# Patient Record
Sex: MALE | Race: WHITE | NOT HISPANIC OR LATINO | URBAN - METROPOLITAN AREA
[De-identification: names, ages, dates, MRNs, and addresses within clinical notes are randomized per-mention and may not be internally consistent; named-entity substitution may affect disease eponyms.]

---

## 2017-01-17 ENCOUNTER — OFFICE VISIT (OUTPATIENT)
Dept: URGENT CARE | Facility: URGENT CARE | Age: 32
End: 2017-01-17
Payer: COMMERCIAL

## 2017-01-17 ENCOUNTER — TELEPHONE (OUTPATIENT)
Dept: NURSING | Facility: CLINIC | Age: 32
End: 2017-01-17

## 2017-01-17 VITALS
OXYGEN SATURATION: 99 % | HEART RATE: 72 BPM | DIASTOLIC BLOOD PRESSURE: 70 MMHG | WEIGHT: 150 LBS | SYSTOLIC BLOOD PRESSURE: 110 MMHG | TEMPERATURE: 97.4 F

## 2017-01-17 DIAGNOSIS — J06.9 UPPER RESPIRATORY TRACT INFECTION, UNSPECIFIED TYPE: Primary | ICD-10-CM

## 2017-01-17 PROCEDURE — 99203 OFFICE O/P NEW LOW 30 MIN: CPT | Performed by: INTERNAL MEDICINE

## 2017-01-17 NOTE — TELEPHONE ENCOUNTER
"Call Type: Triage Call    Presenting Problem: Onset 01/13/17 of fever, chills and \"Deep chest  cough.\"  Temp. 101.3/oral. Patient described a sudden onset of a  dry nonproductive cough, generalized bodyaches, fever.  Triage Note:  Guideline Title: Cough - Adult ; Flu-Like Symptoms  Recommended Disposition: Provide Home/Self Care  Original Inclination:  Override Disposition:  Intended Action:  Physician Contacted: No  Sudden onset of flu-like symptoms ?  YES  Severe breathing problems ? NO  Cough producing pink, frothy sputum ? NO  Breathing symptoms (post choking episode, shortness of breath, out of breath,  nasal flaring, change in skin color, anxiety) main problem ? NO  Signs of dehydration ? NO  Severe breathing problems ? NO  Breathing problems ? NO  Sudden change in mental status ? NO  Choking sensation, cannot swallow own saliva with associated drooling and soft  muffled voice ? NO  New onset of eye redness, irritation/foreign body sensation or gritty feeling with  watery or sticky mucus drainage ? NO  Temperature of 101.5 F (38.6 C) or greater that has not responded to 24 hours of  home care measures ? NO  New onset of stiff neck causing pain with forward head movement, severe  generalized headache, fever, or altered mental status ? NO  Any temperature elevation in an individual with a weakened immune system OR a  frail elderly person ? NO  Flu-like symptoms associated with a cough and breathing that is becoming more  difficult ? NO  Evaluated by provider AND symptoms worsening when following recommended treatment  plan for 48 hours ? NO  New OR worsening flu-like illness AND in high risk group for complications ? NO  In high risk group for complications of influenza AND has questions/concerns ? NO  Flu-like illness that has not improved after 7 days of home care ? NO  Gradual onset of upper respiratory illness signs and symptoms(If there is any  doubt that symptoms may be an upper respiratory illness, use " this guideline,  Flu-Like Symptoms ) ? NO  Being treated by a provider for a secondary infection AND no improvement in  symptoms, symptoms have worsened OR has new symptoms after following treatment  plan for the time specified by provider. ? NO  Severe headache not relieved with 8 hours of home care ? NO  New productive cough with thick colored sputum (other than clear or white sputum;  not postnasal drainage) ? NO  Pressure/pain above or below eyes, near ears over sinuses (may also be described  as fullness, worsens when bending forward, teeth or eye pain) ? NO  Physician Instructions:  Care Advice: Use a cool mist humidifier to moisten air.  Be sure to clean  according to 's instructions.  Aspirin should not be used in anyone, 18 years of age and under, for  temperature control, pain, or aches when flu is a possibility.  Consider use of a saline nasal spray per package directions to help relieve  nasal congestion.  Call provider immediately if develop a severe productive cough, fever over  101.5 F (38.6 C), and sharp pain when coughing.  Coughing up mucus or phlegm helps to get rid of an infection.  A productive  cough should not be stopped.  A cough medicine with guaifenesin  (Robitussin, Mucinex) can help loosen the mucus.  Cough medicine with  dextromethorphan (DM) should be avoided.  Drinking lots of fluids can help  loosen the mucus too, especially warm fluids.  See a provider immediately or go to the Emergency Department if having  chest pain with breathing, change in level of consciousness, new seizure,  vomiting and unable to keep fluids down for 8 hours or more, or has not  urinated for 8 or more hours.  To help prevent a widespread community outbreak of the flu, call the call  center, your clinic or provider's office to discuss any questions or  concerns before going in unless you have severe respiratory or any nervous  system symptoms.  Influenza - Expected Course: - Symptoms start to  improve in 3 to 7 days. -  Cough and feeling tired (malaise) may continue for several weeks. - Cough  and extreme fatigue lasting more than 3 weeks needs medical evaluation. -  Remain at home at least 24 hours after being free of fever 100F (37.8C)  without the use of fever reducing medication.  Rest as much as possible until symptoms improve.  Analgesic/Antipyretic Advice - NSAIDs: Consider aspirin, ibuprofen,  naproxen or ketoprofen for pain or fever as directed on label or by  pharmacist/provider. PRECAUTIONS: - You should not take this medicine for  more than 10 days unless recommended by your provider. EXCEPTIONS: - Should  not be used if taking blood thinners or have bleeding problems. - Do not  use if have history of sensitivity/allergy to any of these medications  or history of cardiovascular, ulcer, kidney, liver disease or diabetes  unless approved by provider. - Do not exceed recommended dose or frequency.  Influenza Respiratory Hygiene: - Cover the nose/mouth tightly with a tissue  when coughing or sneezing. - Use tissue one time and discard in the nearest  waste receptacle. - Wash hands with soap and water or alcohol-based hand  rub for at least 20 seconds after coming into contact with respiratory  secretions and contaminated objects/materials. - When a tissue is not  available, cough into the bend of the elbow. - Avoid touching your eyes,  nose or mouth. - When ill wear a disposable face mask when around others,  especially around those at high risk for flu-related complications, to help  decrease the likelihood of infecting them.  Analgesic/Antipyretic Advice - Acetaminophen:  Consider acetaminophen as  directed on label or by pharmacist/provider for pain or fever.  PRECAUTIONS:  - Use only if there is no history of liver disease,  alcoholism, or intake of three or more alcohol drinks per day.  - If  approved by provider when breastfeeding. - Do not exceed recommended dose  or frequency. Do not take  more than 3000 milligrams (mg) in 24 hours. Do  not take this medicine for more than 10 days unless recommended by your  provider. - To make sure you don't take too much, check other medicines you  take to see if they also contain acetaminophen.  Antiviral medication can make flu symptoms milder and shorten the time you  are ill. Flu  antiviral medications are recommended for individuals who  have a greater chance of  serious flu complications or who are very sick  with the flu. They may also be prescribed for individuals who are not a  high risk but who want to lessen flu symptoms and shorten  the time of the  illness. Antiviral medication works best if it is started within 48 hours  of  the first symptoms of flu.

## 2017-01-17 NOTE — NURSING NOTE
Chief Complaint   Patient presents with     Urgent Care     URI     since saturday. Congestion. Headache, sinus pain started yesterday. Clear mucous     Cough     Productive cough     Fever     101.5 yesterday       Initial /70 mmHg  Pulse 72  Temp(Src) 97.4  F (36.3  C) (Tympanic)  Wt 150 lb (68.04 kg)  SpO2 99% There is no height on file to calculate BMI.  BP completed using cuff size: YOLIE Frost

## 2017-01-17 NOTE — PROGRESS NOTES
[facility] Progress Note        Edward Rodríguez MD, MPH  01/17/2017        History:      Hugo Robles is a pleasant 31 year old year old male with a chief complaint of nasal congestion,non-productive cough since 3 days ago.   No fever or chills.   No dyspnea or chest pain.   No smoking history.   No headache or neck pain.  No GI or  symptoms.   No MSK symptoms.         Assessment and Plan:        URI:.  Discussed supportive care with the patient.  Advised to increase fluid intake and rest.  F/u w PCP in 4-5 days, earlier if symptoms worsen.                   Physical Exam:      /70 mmHg  Pulse 72  Temp(Src) 97.4  F (36.3  C) (Tympanic)  Wt 150 lb (68.04 kg)  SpO2 99%     Constitutional: Patient is in no distress The patient is pleasant and cooperative.   HEENT: Head:  Head is atraumatic, normocephalic.    Eyes: Pupils are equal, round and reactive to light and accomodation.  Sclera is non-icteric. No conjunctival injection, or exudate noted. Extraocular motion is intact. Visual acuity is intact bilaterally.  Ears:  External acoustic canals are patent and clear.  There is no erythema and bulging( exudate)  of the ( R/L ) tympanic membrane(s ).   Nose:   Nasal congestion w/o drainage or mucosal ulceration is noted.  Throat:  Oral mucosa is moist.  No oral lesions are noted.  No posterior pharyngeal hyperemia nor exudate noted.     Neck Supple.  There is no cervical lymphadenopathy.  No nuchal rigidity noted.  There is no meningismus.     Cardiovascular: Heart is regular to rate and rhythm.  No murmur is noted.     Lungs: Clear in the anterior and posterior pulmonary fields.   Abdomen: Soft and non-tender.    Back No flank tenderness is noted.   Extremeties No edema, no calf tenderness.   Neuro: No focal deficit.   Skin No petechiae or purpura is noted.  There is no rash.   Mood Normal              Data:      All new lab and imaging data was reviewed.   No results found for this or any  previous visit.

## 2017-03-28 ENCOUNTER — OFFICE VISIT - HEALTHEAST (OUTPATIENT)
Dept: CARDIOLOGY | Facility: CLINIC | Age: 32
End: 2017-03-28

## 2017-03-28 DIAGNOSIS — R55 PRE-SYNCOPE: ICD-10-CM

## 2017-03-28 DIAGNOSIS — R00.0 TACHYCARDIA: ICD-10-CM

## 2017-03-28 DIAGNOSIS — R00.2 PALPITATION: ICD-10-CM

## 2017-03-28 LAB
ATRIAL RATE - MUSE: 56 BPM
DIASTOLIC BLOOD PRESSURE - MUSE: NORMAL MMHG
INTERPRETATION ECG - MUSE: NORMAL
P AXIS - MUSE: -2 DEGREES
PR INTERVAL - MUSE: 186 MS
QRS DURATION - MUSE: 102 MS
QT - MUSE: 376 MS
QTC - MUSE: 362 MS
R AXIS - MUSE: 66 DEGREES
SYSTOLIC BLOOD PRESSURE - MUSE: NORMAL MMHG
T AXIS - MUSE: 53 DEGREES
VENTRICULAR RATE- MUSE: 56 BPM

## 2017-03-28 ASSESSMENT — MIFFLIN-ST. JEOR: SCORE: 1593.97

## 2017-04-07 ENCOUNTER — HOSPITAL ENCOUNTER (OUTPATIENT)
Dept: CARDIOLOGY | Facility: CLINIC | Age: 32
Discharge: HOME | End: 2017-04-07
Attending: INTERNAL MEDICINE

## 2017-04-07 DIAGNOSIS — R00.2 PALPITATION: ICD-10-CM

## 2017-04-07 DIAGNOSIS — R00.0 TACHYCARDIA: ICD-10-CM

## 2017-04-07 DIAGNOSIS — R55 PRE-SYNCOPE: ICD-10-CM

## 2017-04-07 LAB
AORTIC ROOT: 3.1 CM
BSA FOR ECHO PROCEDURE: 1.82 M2
CV BLOOD PRESSURE: NORMAL MMHG
CV ECHO HEIGHT: 68 IN
CV ECHO WEIGHT: 152 LBS
DOP CALC LVOT AREA: 3.14 CM2
DOP CALC LVOT DIAMETER: 2 CM
DOP CALC LVOT PEAK VEL: 80.8 CM/S
DOP CALC LVOT STROKE VOLUME: 44.9 CM3
DOP CALCLVOT PEAK VEL VTI: 14.3 CM
ECHO EJECTION FRACTION ESTIMATED: 60 %
EJECTION FRACTION: 57 % (ref 55–75)
FRACTIONAL SHORTENING: 31.1 % (ref 28–44)
INTERVENTRICULAR SEPTUM IN END DIASTOLE: 0.66 CM (ref 0.6–1)
IVS/PW RATIO: 0.9
LA AREA 1: 12.4 CM2
LA AREA 2: 14 CM2
LEFT ATRIUM LENGTH: 3.93 CM
LEFT ATRIUM SIZE: 3.5 CM
LEFT ATRIUM TO AORTIC ROOT RATIO: 1.13 NO UNITS
LEFT ATRIUM VOLUME INDEX: 20.6 ML/M2
LEFT ATRIUM VOLUME: 37.5 CM3
LEFT VENTRICLE CARDIAC INDEX: 1.6 L/MIN/M2
LEFT VENTRICLE CARDIAC OUTPUT: 2.9 L/MIN
LEFT VENTRICLE DIASTOLIC VOLUME INDEX: 50.5 CM3/M2 (ref 34–74)
LEFT VENTRICLE DIASTOLIC VOLUME: 92 CM3 (ref 62–150)
LEFT VENTRICLE HEART RATE: 64 BPM
LEFT VENTRICLE MASS INDEX: 77.6 G/M2
LEFT VENTRICLE SYSTOLIC VOLUME INDEX: 22 CM3/M2 (ref 11–31)
LEFT VENTRICLE SYSTOLIC VOLUME: 40 CM3 (ref 21–61)
LEFT VENTRICULAR INTERNAL DIMENSION IN DIASTOLE: 5.66 CM (ref 4.2–5.8)
LEFT VENTRICULAR INTERNAL DIMENSION IN SYSTOLE: 3.9 CM (ref 2.5–4)
LEFT VENTRICULAR MASS: 141.3 G
LEFT VENTRICULAR OUTFLOW TRACT MEAN GRADIENT: 2 MMHG
LEFT VENTRICULAR OUTFLOW TRACT MEAN VELOCITY: 57.1 CM/S
LEFT VENTRICULAR OUTFLOW TRACT PEAK GRADIENT: 3 MMHG
LEFT VENTRICULAR POSTERIOR WALL IN END DIASTOLE: 0.73 CM (ref 0.6–1)
LV STROKE VOLUME INDEX: 24.7 ML/M2
MITRAL VALVE E/A RATIO: 1.7
MV AVERAGE E/E' RATIO: 4.1 CM/S
MV DECELERATION TIME: 296 MS
MV E'TISSUE VEL-LAT: 16.3 CM/S
MV E'TISSUE VEL-MED: 12.4 CM/S
MV LATERAL E/E' RATIO: 3.6
MV MEDIAL E/E' RATIO: 4.8
MV PEAK A VELOCITY: 35.8 CM/S
MV PEAK E VELOCITY: 59.2 CM/S
NUC REST DIASTOLIC VOLUME INDEX: 2432 LBS
NUC REST SYSTOLIC VOLUME INDEX: 68 IN

## 2017-04-07 ASSESSMENT — MIFFLIN-ST. JEOR: SCORE: 1593.97

## 2017-05-22 ENCOUNTER — OFFICE VISIT - HEALTHEAST (OUTPATIENT)
Dept: INTERNAL MEDICINE | Facility: CLINIC | Age: 32
End: 2017-05-22

## 2017-05-22 DIAGNOSIS — Z00.00 PREVENTATIVE HEALTH CARE: ICD-10-CM

## 2017-05-22 ASSESSMENT — MIFFLIN-ST. JEOR: SCORE: 1608.89

## 2017-05-24 ENCOUNTER — COMMUNICATION - HEALTHEAST (OUTPATIENT)
Dept: INTERNAL MEDICINE | Facility: CLINIC | Age: 32
End: 2017-05-24

## 2017-05-24 ENCOUNTER — AMBULATORY - HEALTHEAST (OUTPATIENT)
Dept: LAB | Facility: CLINIC | Age: 32
End: 2017-05-24

## 2017-05-24 DIAGNOSIS — Z00.00 PREVENTATIVE HEALTH CARE: ICD-10-CM

## 2017-05-24 LAB
CHOLEST SERPL-MCNC: 177 MG/DL
FASTING STATUS PATIENT QL REPORTED: YES
HDLC SERPL-MCNC: 59 MG/DL
LDLC SERPL CALC-MCNC: 107 MG/DL
TRIGL SERPL-MCNC: 54 MG/DL

## 2017-05-25 ENCOUNTER — COMMUNICATION - HEALTHEAST (OUTPATIENT)
Dept: INTERNAL MEDICINE | Facility: CLINIC | Age: 32
End: 2017-05-25

## 2017-06-12 ENCOUNTER — COMMUNICATION - HEALTHEAST (OUTPATIENT)
Dept: INTERNAL MEDICINE | Facility: CLINIC | Age: 32
End: 2017-06-12

## 2017-06-20 ENCOUNTER — COMMUNICATION - HEALTHEAST (OUTPATIENT)
Dept: SCHEDULING | Facility: CLINIC | Age: 32
End: 2017-06-20

## 2017-06-23 ENCOUNTER — OFFICE VISIT - HEALTHEAST (OUTPATIENT)
Dept: INTERNAL MEDICINE | Facility: CLINIC | Age: 32
End: 2017-06-23

## 2017-06-23 DIAGNOSIS — F41.1 GAD (GENERALIZED ANXIETY DISORDER): ICD-10-CM

## 2017-06-23 DIAGNOSIS — Z11.1 TUBERCULOSIS SCREENING: ICD-10-CM

## 2017-06-23 ASSESSMENT — MIFFLIN-ST. JEOR: SCORE: 1576.96

## 2017-06-26 ENCOUNTER — AMBULATORY - HEALTHEAST (OUTPATIENT)
Dept: NURSING | Facility: CLINIC | Age: 32
End: 2017-06-26

## 2017-06-29 ENCOUNTER — COMMUNICATION - HEALTHEAST (OUTPATIENT)
Dept: INTERNAL MEDICINE | Facility: CLINIC | Age: 32
End: 2017-06-29

## 2017-06-29 DIAGNOSIS — J30.2 SEASONAL ALLERGIES: ICD-10-CM

## 2017-08-29 ENCOUNTER — OFFICE VISIT - HEALTHEAST (OUTPATIENT)
Dept: INTERNAL MEDICINE | Facility: CLINIC | Age: 32
End: 2017-08-29

## 2017-08-29 ENCOUNTER — COMMUNICATION - HEALTHEAST (OUTPATIENT)
Dept: INTERNAL MEDICINE | Facility: CLINIC | Age: 32
End: 2017-08-29

## 2017-08-29 DIAGNOSIS — S39.012A LUMBOSACRAL STRAIN, INITIAL ENCOUNTER: ICD-10-CM

## 2017-08-29 ASSESSMENT — MIFFLIN-ST. JEOR: SCORE: 1599.64

## 2017-09-24 ENCOUNTER — OFFICE VISIT - HEALTHEAST (OUTPATIENT)
Dept: FAMILY MEDICINE | Facility: CLINIC | Age: 32
End: 2017-09-24
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
## 2017-09-24 DIAGNOSIS — L03.113 CELLULITIS OF RIGHT UPPER ARM: ICD-10-CM

## 2017-10-19 ENCOUNTER — OFFICE VISIT - RIVER FALLS (OUTPATIENT)
Dept: FAMILY MEDICINE | Facility: CLINIC | Age: 32
End: 2017-10-19

## 2017-11-29 ENCOUNTER — OFFICE VISIT - RIVER FALLS (OUTPATIENT)
Dept: FAMILY MEDICINE | Facility: CLINIC | Age: 32
End: 2017-11-29

## 2018-05-18 ENCOUNTER — COMMUNICATION - HEALTHEAST (OUTPATIENT)
Dept: INTERNAL MEDICINE | Facility: CLINIC | Age: 33
End: 2018-05-18

## 2018-05-21 ENCOUNTER — OFFICE VISIT - HEALTHEAST (OUTPATIENT)
Dept: INTERNAL MEDICINE | Facility: CLINIC | Age: 33
End: 2018-05-21

## 2018-05-21 DIAGNOSIS — Z00.00 ROUTINE GENERAL MEDICAL EXAMINATION AT A HEALTH CARE FACILITY: ICD-10-CM

## 2018-05-21 DIAGNOSIS — R53.83 FATIGUE: ICD-10-CM

## 2018-05-21 LAB
ALBUMIN SERPL-MCNC: 4.1 G/DL (ref 3.5–5)
ALBUMIN UR-MCNC: NEGATIVE MG/DL
ALP SERPL-CCNC: 68 U/L (ref 45–120)
ALT SERPL W P-5'-P-CCNC: 40 U/L (ref 0–45)
ANION GAP SERPL CALCULATED.3IONS-SCNC: 10 MMOL/L (ref 5–18)
APPEARANCE UR: CLEAR
AST SERPL W P-5'-P-CCNC: 34 U/L (ref 0–40)
BASOPHILS # BLD AUTO: 0 THOU/UL (ref 0–0.2)
BASOPHILS NFR BLD AUTO: 1 % (ref 0–2)
BILIRUB SERPL-MCNC: 1 MG/DL (ref 0–1)
BILIRUB UR QL STRIP: NEGATIVE
BUN SERPL-MCNC: 24 MG/DL (ref 8–22)
CALCIUM SERPL-MCNC: 9.2 MG/DL (ref 8.5–10.5)
CHLORIDE BLD-SCNC: 105 MMOL/L (ref 98–107)
CHOLEST SERPL-MCNC: 202 MG/DL
CO2 SERPL-SCNC: 24 MMOL/L (ref 22–31)
COLOR UR AUTO: YELLOW
CREAT SERPL-MCNC: 1.06 MG/DL (ref 0.7–1.3)
EOSINOPHIL # BLD AUTO: 0.2 THOU/UL (ref 0–0.4)
EOSINOPHIL NFR BLD AUTO: 4 % (ref 0–6)
ERYTHROCYTE [DISTWIDTH] IN BLOOD BY AUTOMATED COUNT: 10.6 % (ref 11–14.5)
FASTING STATUS PATIENT QL REPORTED: YES
GFR SERPL CREATININE-BSD FRML MDRD: >60 ML/MIN/1.73M2
GLUCOSE BLD-MCNC: 87 MG/DL (ref 70–125)
GLUCOSE UR STRIP-MCNC: NEGATIVE MG/DL
HCT VFR BLD AUTO: 45.4 % (ref 40–54)
HDLC SERPL-MCNC: 60 MG/DL
HGB BLD-MCNC: 15.3 G/DL (ref 14–18)
HGB UR QL STRIP: NEGATIVE
KETONES UR STRIP-MCNC: NEGATIVE MG/DL
LDLC SERPL CALC-MCNC: 131 MG/DL
LEUKOCYTE ESTERASE UR QL STRIP: NEGATIVE
LYMPHOCYTES # BLD AUTO: 1.4 THOU/UL (ref 0.8–4.4)
LYMPHOCYTES NFR BLD AUTO: 34 % (ref 20–40)
MCH RBC QN AUTO: 31.5 PG (ref 27–34)
MCHC RBC AUTO-ENTMCNC: 33.7 G/DL (ref 32–36)
MCV RBC AUTO: 94 FL (ref 80–100)
MONOCYTES # BLD AUTO: 0.5 THOU/UL (ref 0–0.9)
MONOCYTES NFR BLD AUTO: 11 % (ref 2–10)
NEUTROPHILS # BLD AUTO: 2.1 THOU/UL (ref 2–7.7)
NEUTROPHILS NFR BLD AUTO: 51 % (ref 50–70)
NITRATE UR QL: NEGATIVE
PH UR STRIP: 6 [PH] (ref 5–8)
PLATELET # BLD AUTO: 211 THOU/UL (ref 140–440)
PMV BLD AUTO: 7.7 FL (ref 7–10)
POTASSIUM BLD-SCNC: 4.2 MMOL/L (ref 3.5–5)
PROT SERPL-MCNC: 7.5 G/DL (ref 6–8)
RBC # BLD AUTO: 4.85 MILL/UL (ref 4.4–6.2)
SODIUM SERPL-SCNC: 139 MMOL/L (ref 136–145)
SP GR UR STRIP: 1.01 (ref 1–1.03)
TRIGL SERPL-MCNC: 53 MG/DL
TSH SERPL DL<=0.005 MIU/L-ACNC: 1.33 UIU/ML (ref 0.3–5)
UROBILINOGEN UR STRIP-ACNC: NORMAL
WBC: 4 THOU/UL (ref 4–11)

## 2018-05-21 ASSESSMENT — MIFFLIN-ST. JEOR: SCORE: 1663.14

## 2018-05-23 ENCOUNTER — COMMUNICATION - HEALTHEAST (OUTPATIENT)
Dept: INTERNAL MEDICINE | Facility: CLINIC | Age: 33
End: 2018-05-23

## 2018-05-23 LAB — TESTOST SERPL-MCNC: 1097 NG/DL (ref 240–871)

## 2018-05-30 ENCOUNTER — COMMUNICATION - HEALTHEAST (OUTPATIENT)
Dept: INTERNAL MEDICINE | Facility: CLINIC | Age: 33
End: 2018-05-30

## 2018-06-19 ENCOUNTER — COMMUNICATION - HEALTHEAST (OUTPATIENT)
Dept: INTERNAL MEDICINE | Facility: CLINIC | Age: 33
End: 2018-06-19

## 2018-07-15 ENCOUNTER — OFFICE VISIT (OUTPATIENT)
Dept: URGENT CARE | Facility: URGENT CARE | Age: 33
End: 2018-07-15
Payer: COMMERCIAL

## 2018-07-15 VITALS
HEIGHT: 68 IN | TEMPERATURE: 98.3 F | SYSTOLIC BLOOD PRESSURE: 118 MMHG | DIASTOLIC BLOOD PRESSURE: 76 MMHG | HEART RATE: 80 BPM | BODY MASS INDEX: 24.25 KG/M2 | WEIGHT: 160 LBS

## 2018-07-15 DIAGNOSIS — R07.0 THROAT PAIN: Primary | ICD-10-CM

## 2018-07-15 LAB
DEPRECATED S PYO AG THROAT QL EIA: NORMAL
SPECIMEN SOURCE: NORMAL

## 2018-07-15 PROCEDURE — 87880 STREP A ASSAY W/OPTIC: CPT | Performed by: FAMILY MEDICINE

## 2018-07-15 PROCEDURE — 87081 CULTURE SCREEN ONLY: CPT | Performed by: FAMILY MEDICINE

## 2018-07-15 PROCEDURE — 99213 OFFICE O/P EST LOW 20 MIN: CPT | Performed by: FAMILY MEDICINE

## 2018-07-15 NOTE — PROGRESS NOTES
"SUBJECTIVE:   Hugo Robles is a 33 year old male presenting with a chief complaint of sore throat.  No fever, no congestion.  No close strep contact  Onset of symptoms was 3 day(s) ago.  Course of illness is worsening.    Severity moderate  Current and Associated symptoms: sore throat  Treatment measures tried include Tylenol/Ibuprofen, Fluids and Rest.  Predisposing factors include None.    No past medical history on file.  No current outpatient prescriptions on file.     Social History   Substance Use Topics     Smoking status: Never Smoker     Smokeless tobacco: Never Used     Alcohol use Not on file       ROS:  Review of systems negative except as stated above.    OBJECTIVE:  /76  Pulse 80  Temp 98.3  F (36.8  C) (Oral)  Ht 5' 8\" (1.727 m)  Wt 160 lb (72.6 kg)  BMI 24.33 kg/m2  GENERAL APPEARANCE: healthy, alert and no distress  EYES: EOMI,  PERRL, conjunctiva clear  HENT: ear canals and TM's normal.  Nose and mouth without ulcers, erythema or lesions.  Pharynx mildly redden, no exudates  NECK: supple, nontender, no lymphadenopathy  RESP: lungs clear to auscultation - no rales, rhonchi or wheezes  CV: regular rates and rhythm, normal S1 S2, no murmur noted  SKIN: no suspicious lesions or rashes  PSYCH: mentation appears normal and affect normal/bright    Results for orders placed or performed in visit on 07/15/18   Strep, Rapid Screen   Result Value Ref Range    Specimen Description Throat     Rapid Strep A Screen       NEGATIVE: No Group A streptococcal antigen detected by immunoassay, await culture report.       ASSESSMENT/PLAN:  (R07.0) Throat pain  (primary encounter diagnosis)  Comment: viral  Plan: Strep, Rapid Screen, Beta strep group A         culture, magic mouthwash suspension         (diphenhydrAMINE, lidocaine, aluminum-magnesium        & simethicone)            Reassurance given, reviewed symptomatic treatment, plenty of fluids and rest.  Will follow up on throat culture and treat if " positive for strep.  RX magic mouthwash given to help with symptoms.    Return to clinic if no resolution of symptoms    Liam Curiel MD

## 2018-07-15 NOTE — MR AVS SNAPSHOT
"              After Visit Summary   7/15/2018    Hugo Robles    MRN: [id number]           Patient Information     Date Of Birth          1985        Visit Information        Provider Department      7/15/2018 4:15 PM Liam Curiel MD [facility]        Today's Diagnoses     Throat pain    -  1       Follow-ups after your visit        Follow-up notes from your care team     Return if symptoms worsen or fail to improve.      Who to contact     If you have questions or need follow up information about today's clinic visit or your schedule please contact [facility] directly at [phone].  Normal or non-critical lab and imaging results will be communicated to you by [employer], letter or phone within 4 business days after the clinic has received the results. If you do not hear from us within 7 days, please contact the clinic through [employer] or phone. If you have a critical or abnormal lab result, we will notify you by phone as soon as possible.  Submit refill requests through Webcom or call your pharmacy and they will forward the refill request to us. Please allow 3 business days for your refill to be completed.          Additional Information About Your Visit        MyChart Information     Webcom lets you send messages to your doctor, view your test results, renew your prescriptions, schedule appointments and more. To sign up, go to [email or web address] . Click on \"Log in\" on the left side of the screen, which will take you to the Welcome page. Then click on \"Sign up Now\" on the right side of the page.     You will be asked to enter the access code listed below, as well as some personal information. Please follow the directions to create your username and password.     Your access code is: [email or web address]  Expires: 10/23/2018  3:00 PM     Your access code will  in 90 days. If you need help or a new code, please call your [place] clinic or [phone].      " "  Care EveryWhere ID     This is your Care EveryWhere ID. This could be used by other organizations to access your [facility] [place] medical records  [id number]        Your Vitals Were     Pulse Temperature Height BMI (Body Mass Index)          80 98.3  F (36.8  C) (Oral) 5' 8\" (1.727 m) 24.33 kg/m2         Blood Pressure from Last 3 Encounters:   07/15/18 118/76   01/17/17 110/70    Weight from Last 3 Encounters:   07/15/18 160 lb (72.6 kg)   01/17/17 150 lb (68 kg)              We Performed the Following     Beta strep group A culture     Strep, Rapid Screen          Today's Medication Changes          These changes are accurate as of 7/15/18 11:59 PM.  If you have any questions, ask your nurse or doctor.               Start taking these medicines.        Dose/Directions    lidocaine visc 2% 2.5mL/5mL & maalox/mylanta w/ simeth 2.5mL/5mL & diphenhydrAMINE 5mg/5mL Susp suspension   Commonly known as:  [facility]   Used for:  Throat pain   Started by:  Liam Curiel MD        Dose:  10 mL   Swish and swallow 10 mLs in mouth every 6 hours as needed for sore throat   Quantity:  120 mL   Refills:  0            Where to get your medicines      Some of these will need a paper prescription and others can be bought over the counter.  Ask your nurse if you have questions.     Bring a paper prescription for each of these medications     lidocaine visc 2% 2.5mL/5mL & maalox/mylanta w/ simeth 2.5mL/5mL & diphenhydrAMINE 5mg/5mL Susp suspension                Primary Care Provider Office Phone # Fax #    Kandice [facility] [phone] [address] [facility] [address]        Equal Access to Services     ADA SCHWARTZ AH: Guillermo Villarreal, suzan rooney, andrew jeong, walt mclean. [facility] [phone].    ATENCIÓN: Si habla español, tiene a easley disposición servicios gratuitos de asistencia lingüística. Llame al [phone].    We " comply with applicable federal civil rights laws and [place] laws. We do not discriminate on the basis of race, color, national origin, age, disability, sex, sexual orientation, or gender identity.            Thank you!     Thank you for choosing [facility]  for your care. Our goal is always to provide you with excellent care. Hearing back from our patients is one way we can continue to improve our services. Please take a few minutes to complete the written survey that you may receive in the mail after your visit with us. Thank you!             Your Updated Medication List - Protect others around you: Learn how to safely use, store and throw away your medicines at www.disposemymeds.org.          This list is accurate as of 7/15/18 11:59 PM.  Always use your most recent med list.                   Brand Name Dispense Instructions for use Diagnosis    lidocaine visc 2% 2.5mL/5mL & maalox/mylanta w/ simeth 2.5mL/5mL & diphenhydrAMINE 5mg/5mL Susp suspension    [facility]    120 mL    Swish and swallow 10 mLs in mouth every 6 hours as needed for sore throat    Throat pain

## 2018-07-16 LAB
BACTERIA SPEC CULT: NORMAL
SPECIMEN SOURCE: NORMAL

## 2018-07-18 ENCOUNTER — OFFICE VISIT - HEALTHEAST (OUTPATIENT)
Dept: INTERNAL MEDICINE | Facility: CLINIC | Age: 33
End: 2018-07-18

## 2018-07-18 DIAGNOSIS — J06.9 UPPER RESPIRATORY INFECTION: ICD-10-CM

## 2018-07-18 ASSESSMENT — MIFFLIN-ST. JEOR: SCORE: 1626.85

## 2018-08-20 ENCOUNTER — COMMUNICATION - HEALTHEAST (OUTPATIENT)
Dept: INTERNAL MEDICINE | Facility: CLINIC | Age: 33
End: 2018-08-20

## 2018-08-21 ENCOUNTER — AMBULATORY - HEALTHEAST (OUTPATIENT)
Dept: INTERNAL MEDICINE | Facility: CLINIC | Age: 33
End: 2018-08-21

## 2018-08-21 ENCOUNTER — AMBULATORY - HEALTHEAST (OUTPATIENT)
Dept: LAB | Facility: CLINIC | Age: 33
End: 2018-08-21

## 2018-08-21 ENCOUNTER — AMBULATORY - HEALTHEAST (OUTPATIENT)
Dept: NURSING | Facility: CLINIC | Age: 33
End: 2018-08-21

## 2018-08-21 DIAGNOSIS — Z11.1 SCREENING EXAMINATION FOR PULMONARY TUBERCULOSIS: ICD-10-CM

## 2018-08-21 DIAGNOSIS — Z11.1 PPD SCREENING TEST: ICD-10-CM

## 2018-08-25 LAB
GAMMA INTERFERON BACKGROUND BLD IA-ACNC: 0.06 IU/ML
M TB IFN-G BLD-IMP: NEGATIVE
MITOGEN IGNF BCKGRD COR BLD-ACNC: 0.02 IU/ML
MITOGEN IGNF BCKGRD COR BLD-ACNC: 0.06 IU/ML
QTF INTERPRETATION: NORMAL
QTF MITOGEN - NIL: 9.23 IU/ML

## 2018-10-07 ENCOUNTER — OFFICE VISIT (OUTPATIENT)
Dept: URGENT CARE | Facility: URGENT CARE | Age: 33
End: 2018-10-07
Payer: COMMERCIAL

## 2018-10-07 VITALS
BODY MASS INDEX: 23.85 KG/M2 | TEMPERATURE: 97.9 F | HEIGHT: 69 IN | HEART RATE: 59 BPM | WEIGHT: 161 LBS | OXYGEN SATURATION: 100 % | SYSTOLIC BLOOD PRESSURE: 113 MMHG | DIASTOLIC BLOOD PRESSURE: 72 MMHG

## 2018-10-07 DIAGNOSIS — J02.9 SORE THROAT: ICD-10-CM

## 2018-10-07 DIAGNOSIS — R05.9 COUGH: ICD-10-CM

## 2018-10-07 DIAGNOSIS — J20.8 ACUTE VIRAL BRONCHITIS: Primary | ICD-10-CM

## 2018-10-07 LAB
DEPRECATED S PYO AG THROAT QL EIA: NORMAL
SPECIMEN SOURCE: NORMAL

## 2018-10-07 PROCEDURE — 87801 DETECT AGNT MULT DNA AMPLI: CPT | Performed by: FAMILY MEDICINE

## 2018-10-07 PROCEDURE — 87880 STREP A ASSAY W/OPTIC: CPT | Performed by: FAMILY MEDICINE

## 2018-10-07 PROCEDURE — 87081 CULTURE SCREEN ONLY: CPT | Performed by: FAMILY MEDICINE

## 2018-10-07 PROCEDURE — 99213 OFFICE O/P EST LOW 20 MIN: CPT | Performed by: FAMILY MEDICINE

## 2018-10-07 RX ORDER — ALBUTEROL SULFATE 90 UG/1
2 AEROSOL, METERED RESPIRATORY (INHALATION) EVERY 6 HOURS PRN
Qty: 1 INHALER | Refills: 0 | Status: SHIPPED | OUTPATIENT
Start: 2018-10-07

## 2018-10-07 RX ORDER — CODEINE PHOSPHATE AND GUAIFENESIN 10; 100 MG/5ML; MG/5ML
2 SOLUTION ORAL EVERY 6 HOURS PRN
Qty: 120 ML | Refills: 0 | Status: SHIPPED | OUTPATIENT
Start: 2018-10-07

## 2018-10-07 RX ORDER — BENZONATATE 200 MG/1
200 CAPSULE ORAL 3 TIMES DAILY PRN
Qty: 21 CAPSULE | Refills: 0 | Status: SHIPPED | OUTPATIENT
Start: 2018-10-07

## 2018-10-07 NOTE — MR AVS SNAPSHOT
After Visit Summary   10/7/2018    Hugo Robles    MRN: [id number]           Patient Information     Date Of Birth          1985        Visit Information        Provider Department      10/7/2018 10:10 AM Liam Curiel MD [facility]        Today's Diagnoses     Cough    -  1    Sore throat          Care Instructions    Okay to take ibuprofen 200 mg - 4 tablets (800 mg) every 8 hours as needed.  Okay to take tylenol 500 mg - 2 tablets (1000 mg) every 6-8 hours as needed, do not exceed 3000 mg in 24 hours.  Okay for tessalon perles three times a day for cough.  Okay to use albuterol inhaler every 4-6 hours to help with cough.  Use robitussin with codeine at bedtime to help with cough.    We will contact you if pending results are positive.      Viral Upper Respiratory Illness (Adult)  You have a viral upper respiratory illness (URI), which is another term for the common cold. This illness is contagious during the first few days. It is spread through the air by coughing and sneezing. It may also be spread by direct contact (touching the sick person and then touching your own eyes, nose, or mouth). Frequent handwashing will decrease risk of spread. Most viral illnesses go away within 7 to 10 days with rest and simple home remedies. Sometimes the illness may last for several weeks. Antibiotics will not kill a virus, and they are generally not prescribed for this condition.    Home care    If symptoms are severe, rest at home for the first 2 to 3 days. When you resume activity, don't let yourself get too tired.    Avoid being exposed to cigarette smoke (yours or others ).    You may use acetaminophen or ibuprofen to control pain and fever, unless another medicine was prescribed. If you have chronic liver or kidney disease, have ever had a stomach ulcer or gastrointestinal bleeding, or are taking blood-thinning medicines, talk with your healthcare provider before using these  medicines. Aspirin should never be given to anyone under 18 years of age who is ill with a viral infection or fever. It may cause severe liver or brain damage.    Your appetite may be poor, so a light diet is fine. Avoid dehydration by drinking 6 to 8 glasses of fluids per day (water, soft drinks, juices, tea, or soup). Extra fluids will help loosen secretions in the nose and lungs.    Over-the-counter cold medicines will not shorten the length of time you re sick, but they may be helpful for the following symptoms: cough, sore throat, and nasal and sinus congestion. (Note: Do not use decongestants if you have high blood pressure.)  Follow-up care  Follow up with your healthcare provider, or as advised.  When to seek medical advice  Call your healthcare provider right away if any of these occur:    Cough with lots of colored sputum (mucus)    Severe headache; face, neck, or ear pain    Difficulty swallowing due to throat pain    Fever of 100.4 F (38 C) or higher, or as directed by your healthcare provider  Call 911  Call 911 if any of these occur:    Chest pain, shortness of breath, wheezing, or difficulty breathing    Coughing up blood    Inability to swallow due to throat pain  Date Last Reviewed: 9/13/2015 2000-2017 [employer]. [address] [place] [address], [place] [address]. All rights reserved. This information is not intended as a substitute for professional medical care. Always follow your healthcare professional's instructions.        Whooping Cough (Pertussis) (Adult)  Whooping cough (pertussis) is a bacterial infection in the respiratory tract. It can be a very serious infection in infants and older adults. In healthy older children and adults, it is generally mild.  Pertussis is highly contagious. The infection is spread through the air by coughing or sneezing. The illness starts like an ordinary cold with mild cough, congestion, and low fever. Symptoms then develop that may  "include:    Coughing spells that cause a \"whooping\" sound when breathing in    Gagging or vomiting after coughing    Poor appetite    Feeling very tired    Thick mucus in the nose and throat  Antibiotics are used to treat this illness. Even with treatment, it may take up to 3 months for the cough to go away completely.  Vaccination prevents pertussis in children. The vaccine effect lessens after 5 to 10 years. So a booster vaccine is often needed. Teens and adults who were vaccinated as children and have not had a booster can be infected and may spread the infection to unvaccinated infants and children. Be sure to ask your healthcare provider whether anyone in your household needs a booster vaccination.  Home Care    Take all medicine as prescribed by the healthcare provider. Be sure to take antibiotics as directed until they are gone, even if you feel better. If you do not finish the antibiotics, the infection may come back and be harder to treat.    Rest and get plenty of sleep.    Stay home from work or school until you have completed at least 5 days of antibiotic treatment. If antibiotics are not used, stay home until 21 days after you first had symptoms of a cough. When resuming activity, go back to your normal routine gradually.    Avoid exposure to cigarette smoke.    Ask your healthcare provider before taking over-the-counter medicine for fever, pain, and coughing.    To avoid loss of fluids (dehydration), try drinking 6-8 glasses of fluids (water, juice, tea, soup) a day. Fluids will help loosen secretions in the nose and lungs.    Cover your mouth and nose when you sneeze or cough. Dispose of any tissues properly.    Wash your hands well with soap and water after you cough or sneeze, and frequently throughout the day.  Follow-up care  Follow up with your healthcare provider as advised.  When to seek medical advice  Call your healthcare provider right away for any of the following:    Trouble breathing or " "painful breathing    Cough with repeated gagging or vomiting    Coughing up colored or bloody mucus    Severe headache or face, neck, or ear pain    Fever over 100.4 F (38.0 C) for more than 3 days    You don't start improving within 1 week  Date Last Reviewed: 9/25/2015 2000-2017 [employer]. [address] [place] [address]. All rights reserved. This information is not intended as a substitute for professional medical care. Always follow your healthcare professional's instructions.                Follow-ups after your visit        Who to contact     If you have questions or need follow up information about today's clinic visit or your schedule please contact [facility] directly at [phone].  Normal or non-critical lab and imaging results will be communicated to you by Firebasehart, letter or phone within 4 business days after the clinic has received the results. If you do not hear from us within 7 days, please contact the clinic through Firebasehart or phone. If you have a critical or abnormal lab result, we will notify you by phone as soon as possible.  Submit refill requests through Penana or call your pharmacy and they will forward the refill request to us. Please allow 3 business days for your refill to be completed.          Additional Information About Your Visit        Penana Information     Penana lets you send messages to your doctor, view your test results, renew your prescriptions, schedule appointments and more. To sign up, go to [email or web address] . Click on \"Log in\" on the left side of the screen, which will take you to the Welcome page. Then click on \"Sign up Now\" on the right side of the page.     You will be asked to enter the access code listed below, as well as some personal information. Please follow the directions to create your username and password.     Your access code is: [email or web address]  Expires: 10/23/2018  3:00 PM     Your access code " "will  in 90 days. If you need help or a new code, please call your [facility] clinic or [phone].        Care EveryWhere ID     This is your Care EveryWhere ID. This could be used by other organizations to access your [facility] medical records  [id number]        Your Vitals Were     Pulse Temperature Height Pulse Oximetry BMI (Body Mass Index)       59 97.9  F (36.6  C) (Oral) 5' 8.5\" (1.74 m) 100% 24.12 kg/m2        Blood Pressure from Last 3 Encounters:   10/07/18 113/72   07/15/18 118/76   17 110/70    Weight from Last 3 Encounters:   10/07/18 161 lb (73 kg)   07/15/18 160 lb (72.6 kg)   17 150 lb (68 kg)              We Performed the Following     Beta strep group A culture     Bordetella per/paraper PCR     Rapid strep screen          Today's Medication Changes          These changes are accurate as of 10/7/18 11:14 AM.  If you have any questions, ask your nurse or doctor.               Start taking these medicines.        Dose/Directions    albuterol 108 (90 Base) MCG/ACT inhaler   Commonly known as:  PROAIR HFA/PROVENTIL HFA/VENTOLIN HFA   Used for:  Cough   Started by:  Liam Curiel MD        Dose:  2 puff   Inhale 2 puffs into the lungs every 6 hours as needed for shortness of breath / dyspnea or wheezing   Quantity:  1 Inhaler   Refills:  0       benzonatate 200 MG capsule   Commonly known as:  TESSALON   Used for:  Cough   Started by:  Liam Curiel MD        Dose:  200 mg   Take 1 capsule (200 mg) by mouth 3 times daily as needed for cough   Quantity:  21 capsule   Refills:  0       guaiFENesin-codeine 100-10 MG/5ML Soln solution   Commonly known as:  ROBITUSSIN AC   Used for:  Cough   Started by:  Liam Curiel MD        Dose:  2 tsp.   Take 10 mLs by mouth every 6 hours as needed for cough   Quantity:  120 mL   Refills:  0            Where to get your medicines      These medications were sent to [id number] - [place], [place] - [address]  [address], [place] [address]     Phone:  [phone]     albuterol 108 (90 Base) MCG/ACT inhaler    benzonatate 200 MG capsule         Some of these will need a paper prescription and others can be bought over the counter.  Ask your nurse if you have questions.     Bring a paper prescription for each of these medications     guaiFENesin-codeine 100-10 MG/5ML Soln solution                Primary Care Provider Office Phone # Fax #    [facility] [phone] [address] [facility] [address]        Equal Access to Services     [facility] : Hadii aad ku hadasho Soomaali, waaxda luqadaha, qaybta kaalmada aderakanyada, walt liz . [facility] [phone].    ATENCIÓN: Si habla español, tiene a easley disposición servicios gratuitos de asistencia lingüística. [facility] [phone].    We comply with applicable federal civil rights laws and [place] laws. We do not discriminate on the basis of race, color, national origin, age, disability, sex, sexual orientation, or gender identity.            Thank you!     Thank you for choosing [facility]  for your care. Our goal is always to provide you with excellent care. Hearing back from our patients is one way we can continue to improve our services. Please take a few minutes to complete the written survey that you may receive in the mail after your visit with us. Thank you!             Your Updated Medication List - Protect others around you: Learn how to safely use, store and throw away your medicines at www.disposemymeds.org.          This list is accurate as of 10/7/18 11:14 AM.  Always use your most recent med list.                   Brand Name Dispense Instructions for use Diagnosis    albuterol 108 (90 Base) MCG/ACT inhaler    PROAIR HFA/PROVENTIL HFA/VENTOLIN HFA    1 Inhaler    Inhale 2 puffs into the lungs every 6 hours as needed for shortness of breath /  dyspnea or wheezing    Cough       benzonatate 200 MG capsule    TESSALON    21 capsule    Take 1 capsule (200 mg) by mouth 3 times daily as needed for cough    Cough       guaiFENesin-codeine 100-10 MG/5ML Soln solution    ROBITUSSIN AC    120 mL    Take 10 mLs by mouth every 6 hours as needed for cough    Cough       lidocaine visc 2% 2.5mL/5mL & maalox/mylanta w/ simeth 2.5mL/5mL & diphenhydrAMINE 5mg/5mL Susp suspension    [facility]    120 mL    Swish and swallow 10 mLs in mouth every 6 hours as needed for sore throat    Throat pain

## 2018-10-07 NOTE — PATIENT INSTRUCTIONS
Okay to take ibuprofen 200 mg - 4 tablets (800 mg) every 8 hours as needed.  Okay to take tylenol 500 mg - 2 tablets (1000 mg) every 6-8 hours as needed, do not exceed 3000 mg in 24 hours.  Okay for tessalon perles three times a day for cough.  Okay to use albuterol inhaler every 4-6 hours to help with cough.  Use robitussin with codeine at bedtime to help with cough.    We will contact you if pending results are positive.      Viral Upper Respiratory Illness (Adult)  You have a viral upper respiratory illness (URI), which is another term for the common cold. This illness is contagious during the first few days. It is spread through the air by coughing and sneezing. It may also be spread by direct contact (touching the sick person and then touching your own eyes, nose, or mouth). Frequent handwashing will decrease risk of spread. Most viral illnesses go away within 7 to 10 days with rest and simple home remedies. Sometimes the illness may last for several weeks. Antibiotics will not kill a virus, and they are generally not prescribed for this condition.    Home care    If symptoms are severe, rest at home for the first 2 to 3 days. When you resume activity, don't let yourself get too tired.    Avoid being exposed to cigarette smoke (yours or others ).    You may use acetaminophen or ibuprofen to control pain and fever, unless another medicine was prescribed. If you have chronic liver or kidney disease, have ever had a stomach ulcer or gastrointestinal bleeding, or are taking blood-thinning medicines, talk with your healthcare provider before using these medicines. Aspirin should never be given to anyone under 18 years of age who is ill with a viral infection or fever. It may cause severe liver or brain damage.    Your appetite may be poor, so a light diet is fine. Avoid dehydration by drinking 6 to 8 glasses of fluids per day (water, soft drinks, juices, tea, or soup). Extra fluids will help loosen secretions in the  "nose and lungs.    Over-the-counter cold medicines will not shorten the length of time you re sick, but they may be helpful for the following symptoms: cough, sore throat, and nasal and sinus congestion. (Note: Do not use decongestants if you have high blood pressure.)  Follow-up care  Follow up with your healthcare provider, or as advised.  When to seek medical advice  Call your healthcare provider right away if any of these occur:    Cough with lots of colored sputum (mucus)    Severe headache; face, neck, or ear pain    Difficulty swallowing due to throat pain    Fever of 100.4 F (38 C) or higher, or as directed by your healthcare provider  Call 911  Call 911 if any of these occur:    Chest pain, shortness of breath, wheezing, or difficulty breathing    Coughing up blood    Inability to swallow due to throat pain  Date Last Reviewed: 9/13/2015 2000-2017 [employer]. [address] [place] [address]. All rights reserved. This information is not intended as a substitute for professional medical care. Always follow your healthcare professional's instructions.        Whooping Cough (Pertussis) (Adult)  Whooping cough (pertussis) is a bacterial infection in the respiratory tract. It can be a very serious infection in infants and older adults. In healthy older children and adults, it is generally mild.  Pertussis is highly contagious. The infection is spread through the air by coughing or sneezing. The illness starts like an ordinary cold with mild cough, congestion, and low fever. Symptoms then develop that may include:    Coughing spells that cause a \"whooping\" sound when breathing in    Gagging or vomiting after coughing    Poor appetite    Feeling very tired    Thick mucus in the nose and throat  Antibiotics are used to treat this illness. Even with treatment, it may take up to 3 months for the cough to go away completely.  Vaccination prevents pertussis in children. The vaccine effect " lessens after 5 to 10 years. So a booster vaccine is often needed. Teens and adults who were vaccinated as children and have not had a booster can be infected and may spread the infection to unvaccinated infants and children. Be sure to ask your healthcare provider whether anyone in your household needs a booster vaccination.  Home Care    Take all medicine as prescribed by the healthcare provider. Be sure to take antibiotics as directed until they are gone, even if you feel better. If you do not finish the antibiotics, the infection may come back and be harder to treat.    Rest and get plenty of sleep.    Stay home from work or school until you have completed at least 5 days of antibiotic treatment. If antibiotics are not used, stay home until 21 days after you first had symptoms of a cough. When resuming activity, go back to your normal routine gradually.    Avoid exposure to cigarette smoke.    Ask your healthcare provider before taking over-the-counter medicine for fever, pain, and coughing.    To avoid loss of fluids (dehydration), try drinking 6-8 glasses of fluids (water, juice, tea, soup) a day. Fluids will help loosen secretions in the nose and lungs.    Cover your mouth and nose when you sneeze or cough. Dispose of any tissues properly.    Wash your hands well with soap and water after you cough or sneeze, and frequently throughout the day.  Follow-up care  Follow up with your healthcare provider as advised.  When to seek medical advice  Call your healthcare provider right away for any of the following:    Trouble breathing or painful breathing    Cough with repeated gagging or vomiting    Coughing up colored or bloody mucus    Severe headache or face, neck, or ear pain    Fever over 100.4 F (38.0 C) for more than 3 days    You don't start improving within 1 week  Date Last Reviewed: 9/25/2015 2000-2017 [employer]. [address] [place] [address], [place] [address]. All rights reserved. This  information is not intended as a substitute for professional medical care. Always follow your healthcare professional's instructions.

## 2018-10-07 NOTE — PROGRESS NOTES
"SUBJECTIVE:   Hugo Robles is a 33 year old male presenting with a chief complaint of sore throat, cough and congestion, fatigue.  Denies fever but has felt hot.  Denies any rash.  Felt nauseated this morning.  Coughing has gotten worse, will get into coughing fits.  Phlegm in the morning but otherwise dry cough during the day.  Poor sleep and unable to rest.  Onset of symptoms was 5 day(s) ago.  Course of illness is worsening.    Severity moderate  Current and Associated symptoms: cough, fatigue, sore throat, chest painful  Treatment measures tried include Tylenol/Ibuprofen, Fluids and Rest.  Predisposing factors include ill contact: works as teacher at school.    Had tonsillectomy as child.  Denies history of asthma.  No TOB use.  Unsure of Tdap but thinks it is within 10 years.    No past medical history on file.  Current Outpatient Prescriptions   Medication Sig Dispense Refill     magic mouthwash suspension (diphenhydrAMINE, lidocaine, aluminum-magnesium & simethicone) Swish and swallow 10 mLs in mouth every 6 hours as needed for sore throat (Patient not taking: Reported on 10/7/2018) 120 mL 0     Social History   Substance Use Topics     Smoking status: Never Smoker     Smokeless tobacco: Never Used     Alcohol use Not on file       ROS:  Review of systems negative except as stated above.    OBJECTIVE:  /72  Pulse 59  Temp 97.9  F (36.6  C) (Oral)  Ht 5' 8.5\" (1.74 m)  Wt 161 lb (73 kg)  SpO2 100%  BMI 24.12 kg/m2  GENERAL APPEARANCE: healthy, alert and no distress  EYES: EOMI,  PERRL, conjunctiva clear  HENT: ear canals and TM's normal.  Nose and mouth without ulcers, erythema or lesions  NECK: supple, nontender, no lymphadenopathy  RESP: lungs clear to auscultation - no rales, rhonchi or wheezes  CV: regular rates and rhythm, normal S1 S2, no murmur noted  Extremities: no peripheral edema or tenderness, peripheral pulses normal  SKIN: no suspicious lesions or rashes  PSYCH: mentation appears " normal and affect normal/bright    Results for orders placed or performed in visit on 10/07/18   Rapid strep screen   Result Value Ref Range    Specimen Description Throat     Rapid Strep A Screen       NEGATIVE: No Group A streptococcal antigen detected by immunoassay, await culture report.       ASSESSMENT/PLAN:  (J20.8) Acute viral bronchitis  (primary encounter diagnosis)  Plan: benzonatate (TESSALON) 200 MG capsule,         guaiFENesin-codeine (ROBITUSSIN AC) 100-10         MG/5ML SOLN solution, albuterol (PROAIR         HFA/PROVENTIL HFA/VENTOLIN HFA) 108 (90 Base)         MCG/ACT inhaler            (R05) Cough  Comment: viral  Plan: Bordetella per/paraper PCR, benzonatate         (TESSALON) 200 MG capsule, guaiFENesin-codeine         (ROBITUSSIN AC) 100-10 MG/5ML SOLN solution,         albuterol (PROAIR HFA/PROVENTIL HFA/VENTOLIN         HFA) 108 (90 Base) MCG/ACT inhaler, Beta strep         group A culture            (J02.9) Sore throat  Comment: viral  Plan: Rapid strep screen            Reassurance given, reviewed symptomatic treatment, plenty of fluids and rest.  Will screen for pertussis as patient reports coughing fits/spasm.  Discussed that if positive that will need antibiotic treatment.  RX tessalon perles, albuterol inhaler and janiya AC given to help with cough.  Will follow up on throat culture and treat if positive for strep.    Follow up with primary provider if no resolution of symptoms.    Liam Curiel MD  October 7, 2018 11:34 AM

## 2018-10-08 ENCOUNTER — OFFICE VISIT - HEALTHEAST (OUTPATIENT)
Dept: INTERNAL MEDICINE | Facility: CLINIC | Age: 33
End: 2018-10-08

## 2018-10-08 DIAGNOSIS — J06.9 VIRAL URI WITH COUGH: ICD-10-CM

## 2018-10-08 LAB
B PERT+PARAPERT DNA PNL SPEC NAA+PROBE: NEGATIVE
BACTERIA SPEC CULT: NORMAL
SPECIMEN SOURCE: NORMAL
SPECIMEN SOURCE: NORMAL

## 2018-10-08 ASSESSMENT — MIFFLIN-ST. JEOR: SCORE: 1631.39

## 2018-10-09 ENCOUNTER — OFFICE VISIT - HEALTHEAST (OUTPATIENT)
Dept: INTERNAL MEDICINE | Facility: CLINIC | Age: 33
End: 2018-10-09

## 2018-10-09 DIAGNOSIS — J20.9 ACUTE BRONCHITIS: ICD-10-CM

## 2018-10-09 DIAGNOSIS — H10.33 ACUTE CONJUNCTIVITIS OF BOTH EYES: ICD-10-CM

## 2018-10-09 ASSESSMENT — MIFFLIN-ST. JEOR: SCORE: 1635.92

## 2019-01-18 ENCOUNTER — OFFICE VISIT - HEALTHEAST (OUTPATIENT)
Dept: INTERNAL MEDICINE | Facility: CLINIC | Age: 34
End: 2019-01-18

## 2019-01-18 DIAGNOSIS — S16.1XXA STRAIN OF NECK MUSCLE, INITIAL ENCOUNTER: ICD-10-CM

## 2019-01-18 ASSESSMENT — MIFFLIN-ST. JEOR: SCORE: 1654.07

## 2019-01-25 ENCOUNTER — HOSPITAL ENCOUNTER (OUTPATIENT)
Dept: RADIOLOGY | Facility: HOSPITAL | Age: 34
Discharge: HOME | End: 2019-01-25

## 2019-01-25 ENCOUNTER — HOSPITAL ENCOUNTER (OUTPATIENT)
Dept: PHYSICAL MEDICINE AND REHAB | Facility: CLINIC | Age: 34
Discharge: HOME | End: 2019-01-25
Attending: INTERNAL MEDICINE

## 2019-01-25 DIAGNOSIS — M54.2 NECK PAIN ON LEFT SIDE: ICD-10-CM

## 2019-01-25 DIAGNOSIS — M79.18 MYOFASCIAL PAIN: ICD-10-CM

## 2019-01-28 ENCOUNTER — OFFICE VISIT - HEALTHEAST (OUTPATIENT)
Dept: PHYSICAL THERAPY | Facility: REHABILITATION | Age: 34
End: 2019-01-28

## 2019-01-28 DIAGNOSIS — M54.2 NECK PAIN ON LEFT SIDE: ICD-10-CM

## 2019-01-28 DIAGNOSIS — M62.9 MUSCULOSKELETAL DISORDER INVOLVING UPPER TRAPEZIUS MUSCLE: ICD-10-CM

## 2019-01-29 ENCOUNTER — COMMUNICATION - HEALTHEAST (OUTPATIENT)
Dept: PHYSICAL MEDICINE AND REHAB | Facility: CLINIC | Age: 34
End: 2019-01-29

## 2019-02-15 ENCOUNTER — OFFICE VISIT - HEALTHEAST (OUTPATIENT)
Dept: PHYSICAL THERAPY | Facility: REHABILITATION | Age: 34
End: 2019-02-15

## 2019-02-15 DIAGNOSIS — M62.9 MUSCULOSKELETAL DISORDER INVOLVING UPPER TRAPEZIUS MUSCLE: ICD-10-CM

## 2019-02-15 DIAGNOSIS — M54.2 NECK PAIN ON LEFT SIDE: ICD-10-CM

## 2019-02-22 ENCOUNTER — OFFICE VISIT - HEALTHEAST (OUTPATIENT)
Dept: PHYSICAL THERAPY | Facility: REHABILITATION | Age: 34
End: 2019-02-22

## 2019-02-22 DIAGNOSIS — M62.9 MUSCULOSKELETAL DISORDER INVOLVING UPPER TRAPEZIUS MUSCLE: ICD-10-CM

## 2019-02-22 DIAGNOSIS — M54.2 NECK PAIN ON LEFT SIDE: ICD-10-CM

## 2019-03-01 ENCOUNTER — OFFICE VISIT - HEALTHEAST (OUTPATIENT)
Dept: PHYSICAL THERAPY | Facility: REHABILITATION | Age: 34
End: 2019-03-01

## 2019-03-01 DIAGNOSIS — M62.9 MUSCULOSKELETAL DISORDER INVOLVING UPPER TRAPEZIUS MUSCLE: ICD-10-CM

## 2019-03-01 DIAGNOSIS — M54.2 NECK PAIN ON LEFT SIDE: ICD-10-CM

## 2019-03-08 ENCOUNTER — OFFICE VISIT - HEALTHEAST (OUTPATIENT)
Dept: PHYSICAL THERAPY | Facility: REHABILITATION | Age: 34
End: 2019-03-08

## 2019-03-08 DIAGNOSIS — M54.2 NECK PAIN ON LEFT SIDE: ICD-10-CM

## 2019-03-08 DIAGNOSIS — M62.9 MUSCULOSKELETAL DISORDER INVOLVING UPPER TRAPEZIUS MUSCLE: ICD-10-CM

## 2019-03-15 ENCOUNTER — OFFICE VISIT - HEALTHEAST (OUTPATIENT)
Dept: PHYSICAL THERAPY | Facility: REHABILITATION | Age: 34
End: 2019-03-15

## 2019-03-15 DIAGNOSIS — M54.2 NECK PAIN ON LEFT SIDE: ICD-10-CM

## 2019-03-15 DIAGNOSIS — M62.9 MUSCULOSKELETAL DISORDER INVOLVING UPPER TRAPEZIUS MUSCLE: ICD-10-CM

## 2019-03-22 ENCOUNTER — OFFICE VISIT - HEALTHEAST (OUTPATIENT)
Dept: PHYSICAL THERAPY | Facility: REHABILITATION | Age: 34
End: 2019-03-22

## 2019-03-22 DIAGNOSIS — M62.9 MUSCULOSKELETAL DISORDER INVOLVING UPPER TRAPEZIUS MUSCLE: ICD-10-CM

## 2019-03-22 DIAGNOSIS — M54.2 NECK PAIN ON LEFT SIDE: ICD-10-CM

## 2019-03-29 ENCOUNTER — OFFICE VISIT - HEALTHEAST (OUTPATIENT)
Dept: PHYSICAL THERAPY | Facility: REHABILITATION | Age: 34
End: 2019-03-29

## 2019-03-29 DIAGNOSIS — M62.9 MUSCULOSKELETAL DISORDER INVOLVING UPPER TRAPEZIUS MUSCLE: ICD-10-CM

## 2019-03-29 DIAGNOSIS — M54.2 NECK PAIN ON LEFT SIDE: ICD-10-CM

## 2019-04-12 ENCOUNTER — OFFICE VISIT - HEALTHEAST (OUTPATIENT)
Dept: PHYSICAL THERAPY | Facility: REHABILITATION | Age: 34
End: 2019-04-12

## 2019-04-12 DIAGNOSIS — M54.2 NECK PAIN ON LEFT SIDE: ICD-10-CM

## 2019-04-12 DIAGNOSIS — M62.9 MUSCULOSKELETAL DISORDER INVOLVING UPPER TRAPEZIUS MUSCLE: ICD-10-CM

## 2019-04-19 ENCOUNTER — OFFICE VISIT - HEALTHEAST (OUTPATIENT)
Dept: PHYSICAL THERAPY | Facility: REHABILITATION | Age: 34
End: 2019-04-19

## 2019-04-19 DIAGNOSIS — M54.2 NECK PAIN ON LEFT SIDE: ICD-10-CM

## 2019-04-19 DIAGNOSIS — M62.9 MUSCULOSKELETAL DISORDER INVOLVING UPPER TRAPEZIUS MUSCLE: ICD-10-CM

## 2019-04-26 ENCOUNTER — OFFICE VISIT - HEALTHEAST (OUTPATIENT)
Dept: PHYSICAL THERAPY | Facility: REHABILITATION | Age: 34
End: 2019-04-26

## 2019-04-26 DIAGNOSIS — M54.2 NECK PAIN ON LEFT SIDE: ICD-10-CM

## 2019-04-26 DIAGNOSIS — M62.9 MUSCULOSKELETAL DISORDER INVOLVING UPPER TRAPEZIUS MUSCLE: ICD-10-CM

## 2019-05-03 ENCOUNTER — OFFICE VISIT - HEALTHEAST (OUTPATIENT)
Dept: PHYSICAL THERAPY | Facility: REHABILITATION | Age: 34
End: 2019-05-03

## 2019-05-03 DIAGNOSIS — M54.2 NECK PAIN ON LEFT SIDE: ICD-10-CM

## 2019-05-03 DIAGNOSIS — M62.9 MUSCULOSKELETAL DISORDER INVOLVING UPPER TRAPEZIUS MUSCLE: ICD-10-CM

## 2019-05-10 ENCOUNTER — OFFICE VISIT - HEALTHEAST (OUTPATIENT)
Dept: PHYSICAL THERAPY | Facility: REHABILITATION | Age: 34
End: 2019-05-10

## 2019-05-10 DIAGNOSIS — M62.9 MUSCULOSKELETAL DISORDER INVOLVING UPPER TRAPEZIUS MUSCLE: ICD-10-CM

## 2019-05-10 DIAGNOSIS — M54.2 NECK PAIN ON LEFT SIDE: ICD-10-CM

## 2021-05-27 NOTE — PROGRESS NOTES
[facility] Daily Progress     Patient Name: Hugo Robles  Date: 3/29/2019   Visit #: 8/12 (10 visits are authorized)   PTA visit #:    Referral Diagnosis:Neck pain on left side, myofascial pain   Referring provider: Ellie Foster C*  Visit Diagnosis:     ICD-10-CM    1. Neck pain on left side M54.2    2. Musculoskeletal disorder involving upper trapezius muscle M53.82      Precautions: palpitations. 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 year old male with chronic mild to moderate left-sided neck pain C3-4 region with pain into left trapezius.  Patient has difficulty with studying, reading, writing and lifting heavy upper body weights. Findings are consistent with facet mediated pain and cervical/upper back myofascial pain.  Pt presents with positive median and ulnar nerve testing, along with painful AROM SB and Rot to left.    Patient demonstrates understanding/independence with home program.  Patient is benefitting from skilled physical therapy and is making steady progress toward functional goals.  Patient is appropriate to continue with skilled physical therapy intervention, as indicated by initial plan of care.    Goal Status:  Pt. will demonstrate/verbalize independence in self-management of condition in : 12 weeks  Pt. will be independent with home exercise program in : 6 weeks  Pt. will improve posture : and maintain posture for;Comment;in 6 weeks  Comment:: studying with lumbar support and less neck pain   Patient Turn Head: for other;with full ROM;wiith no pain;in 12 weeks    Pt will: return to lifting heavy weights without pain in 12 weeks to allow for increased QOL.       Plan / Patient Education:     Continue with initial plan of care.  Progress with home program as tolerated.     Plan for next session: Ask about return to swim,  chin tuck and lift review, check nerve glides, STM     Subjective:   Pt reports he is 70% better. 6 months pt was 100%   Pt felt great after cupping. Continues to improve. Took this week off from working out.     Pain Rating: not assessed      Objective:   Exercises:  Exercise #1: chin tuck in sitting and supine - chin tuck and lift 10 sec x5-8 reps   Comment #1: foam roller: horizontal and vertical, chest openner and upper back self-massage and facet joint mobility  Exercise #2: Nerve glides: median nerve tensioners and rockers, ulnar nerve butterfly x10-15   Comment #2: SNAGS   Exercise #3: I's, W's in prone 10 sec hold x10 - on therapeutic ball added T's   Comment #3: Serratus: punches with 12# 2X10-15, push up + in plank, with and without bosu (1/2 push-up with push-up +)   Exercise #4: Dynamic plank on ex's ball and on bosu, plank with weight to avoid wrist pain   Comment #4: Discussed RC strengthing pt is performing IR, ER with band - added scaption with 2# high repetition   Exercise #5: Foam roller: lat stretch bilateral and unilateral ams   Exercise #6: 1/2 kneel thoraic rotation at wall   Comment #6: thread the needle       Cervical ROM:             Date: 1/28/2019  3/8/19     *Indicate scale AROM AROM AROM   Cervical Flexion (45) WLF       Cervical Extension (45) WFL - pain   WFL slightly less pain        Right Left Right Left Right Left   Cervical Sidebending (45) 45 pain on L  38 pain on L  50 tightness on L   55 tightness on R - slight pain along spine, tingling       Cervical Rotation (60-80)  70 60 pain on L  76  68 no P         Chin tuck and lift: 30 seconds - improvement from last session     Treatment Today     TREATMENT MINUTES COMMENTS   Evaluation     Self-care/ Home management     Manual therapy 55 Supine: nerve glides focus on L median nerve, bilateral ulnar nerve tight. Subscapularis release, Pec major and minor STM   Prone: infraspinatus bilaterally,  supraspinatus, UT, rhomoids, spinal  erectors, QL  Cupping along spinal erectors (thoracic/cervical), rhomboid, UT: Mild redness along L upper back, less then last session.  No redness observed from last session    S/L on R: L shoulder PROM circumduction, flexion (with pain at end range), scapular release    Neuromuscular Re-education     Therapeutic Activity     Therapeutic Exercises  See ex's flow sheet    Gait training     Modality__________________                Total 55    Blank areas are intentional and mean the treatment did not include these items.       Edilma Mcmullen, PT, DPT  3/29/2019

## 2021-05-27 NOTE — PROGRESS NOTES
[facility] Daily Progress     Patient Name: Hugo Robles  Date: 4/12/2019   Visit #: 9/12 (10 visits are authorized)   PTA visit #:    Referral Diagnosis:Neck pain on left side, myofascial pain   Referring provider: Ellie Foster C*  Visit Diagnosis:     ICD-10-CM    1. Neck pain on left side M54.2    2. Musculoskeletal disorder involving upper trapezius muscle M53.82      Precautions: palpitations. 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  Would recommend pt continue therapy for 4-5 more sessions to continue addressing left cervical pain.  Pt has been compliant with HEP overall.  Residual shoulder discomfort from surgery 10+ years ago and neural tension on L side has significantly improved.  Pt has not had a severe headache in 4 weeks.      From Evaluation: Pt is a 34 y.o. year old male with chronic mild to moderate left-sided neck pain C3-4 region with pain into left trapezius.  Patient has difficulty with studying, reading, writing and lifting heavy upper body weights. Findings are consistent with facet mediated pain and cervical/upper back myofascial pain.  Pt presents with positive median and ulnar nerve testing, along with painful AROM SB and Rot to left.    Patient demonstrates understanding/independence with home program.  Patient is benefitting from skilled physical therapy and is making steady progress toward functional goals.  Patient is appropriate to continue with skilled physical therapy intervention, as indicated by initial plan of care.    Goal Status:  Pt. will demonstrate/verbalize independence in self-management of condition in : 12 weeks  Pt. will be independent with home exercise program in : 6 weeks  Pt. will improve posture : and maintain posture for;Comment;in 6 weeks  Comment:: studying with lumbar support and less  neck pain   Patient Turn Head: for other;with full ROM;wiith no pain;in 12 weeks    Pt will: return to lifting heavy weights without pain in 12 weeks to allow for increased QOL.       Plan / Patient Education:     Continue with initial plan of care.  Progress with home program as tolerated.     Plan for next session:  STM     Subjective:   Pt reports he is 80%-85% better. Mild HA but overall HA have improved, has not had significant HA in 4-5 weeks..  Improved ROM.   Has only worked out 2x this the past couple weeks, not as compliant with HEP but still performing nerve glides.  Studying for exams constantly.  Overall doing ok.      Pain Rating: not assessed      Objective:   Exercises:  Exercise #1: chin tuck in sitting and supine - chin tuck and lift 10 sec x5-8 reps   Comment #1: foam roller: horizontal and vertical, chest openner and upper back self-massage and facet joint mobility  Exercise #2: Nerve glides: median nerve tensioners and rockers, ulnar nerve butterfly x10-15   Comment #2: SNAGS   Exercise #3: I's, W's in prone 10 sec hold x10 - on therapeutic ball added T's   Comment #3: Serratus: punches with 12# 2X10-15, push up + in plank, with and without bosu (1/2 push-up with push-up +)   Exercise #4: Dynamic plank on ex's ball and on bosu, plank with weight to avoid wrist pain   Comment #4: Discussed RC strengthing pt is performing IR, ER with band - added scaption with 2# high repetition   Exercise #5: Foam roller: lat stretch bilateral and unilateral ams   Exercise #6: 1/2 kneel thoraic rotation at wall   Comment #6: thread the needle     Cervical ROM:             Date: 1/28/2019  3/8/19    *Indicate scale AROM AROM AROM   Cervical Flexion (45) WLF       Cervical Extension (45) WFL - pain   WFL slightly less pain        Right Left Right Left Right Left   Cervical Sidebending (45) 45 pain on L  38 pain on L  50 tightness on L   55 tightness on R - slight pain along spine, tingling       Cervical Rotation  (60-80)  70 60 pain on L  76  68 no P         Nerve glide testing negative bilaterally for ulnar and median nerves.     Treatment Today     TREATMENT MINUTES COMMENTS   Evaluation     Self-care/ Home management     Manual therapy 55 Supine: nerve glides median and ulnar bilaterally    Pec major and minor STM/release   Prone: infraspinatus bilaterally,  supraspinatus, UT, rhomoids, spinal erectors  Cupping along spinal erectors (thoracic/cervical), rhomboid, UT: Mild redness but resolved by the end of session except for L scapular region.     Neuromuscular Re-education     Therapeutic Activity     Therapeutic Exercises  See ex's flow sheet    Gait training     Modality__________________                Total 55    Blank areas are intentional and mean the treatment did not include these items.       Edilma Mcmullen, PT, DPT  4/12/2019

## 2021-05-27 NOTE — PROGRESS NOTES
[facility] Daily Progress     Patient Name: Hugo Robles  Date: 4/19/2019   Visit #: 10/12   PTA visit #:    Referral Diagnosis:Neck pain on left side, myofascial pain   Referring provider: Ellie Foster C*  Visit Diagnosis:     ICD-10-CM    1. Neck pain on left side M54.2    2. Musculoskeletal disorder involving upper trapezius muscle M53.82      Precautions: palpitations. 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  Would recommend pt continue therapy for 4-5 more sessions to continue addressing left cervical pain.  Pt has been compliant with HEP overall.  Residual shoulder discomfort from surgery 10+ years ago and neural tension on L side has significantly improved.  Pt has not had a severe headache in 4 weeks.      From Evaluation: Pt is a 34 y.o. year old male with chronic mild to moderate left-sided neck pain C3-4 region with pain into left trapezius.  Patient has difficulty with studying, reading, writing and lifting heavy upper body weights. Findings are consistent with facet mediated pain and cervical/upper back myofascial pain.  Pt presents with positive median and ulnar nerve testing, along with painful AROM SB and Rot to left.    Patient demonstrates understanding/independence with home program.  Patient is benefitting from skilled physical therapy and is making steady progress toward functional goals.  Patient is appropriate to continue with skilled physical therapy intervention, as indicated by initial plan of care.    Goal Status:  Pt. will demonstrate/verbalize independence in self-management of condition in : 12 weeks  Pt. will be independent with home exercise program in : 6 weeks  Pt. will improve posture : and maintain posture for;Comment;in 6 weeks  Comment:: studying with lumbar support and less neck pain   Patient Turn  Head: for other;with full ROM;wiith no pain;in 12 weeks    Pt will: return to lifting heavy weights without pain in 12 weeks to allow for increased QOL.       Plan / Patient Education:     Continue with initial plan of care.  Progress with home program as tolerated.     Plan for next session:  STM     Subjective:   Pt did a back workout on Monday.  On Tuesday on left side very sore- upper back tingling and neck soreness,  shoulder ok.Hasn't done pull-ups in a couple weeks. No HA this week.        Pt reports he is 80%-85% better. Mild HA but overall HA have improved, has not had significant HA in 4-5 weeks..  Improved ROM.   Has only worked out 2x this the past couple weeks, not as compliant with HEP but still performing nerve glides.  Studying for exams constantly.  Overall doing ok.      Pain Rating: not assessed      Objective:   Exercises:  Exercise #1: chin tuck in sitting and supine - chin tuck and lift 10 sec x5-8 reps   Comment #1: foam roller: horizontal and vertical, chest openner and upper back self-massage and facet joint mobility  Exercise #2: Nerve glides: median nerve tensioners and rockers, ulnar nerve butterfly x10-15   Comment #2: SNAGS   Exercise #3: I's, W's in prone 10 sec hold x10 - on therapeutic ball added T's   Comment #3: Serratus: punches with 12# 2X10-15, push up + in plank, with and without bosu (1/2 push-up with push-up +)   Exercise #4: Dynamic plank on ex's ball and on bosu, plank with weight to avoid wrist pain   Comment #4: Discussed RC strengthing pt is performing IR, ER with band - added scaption with 2# high repetition   Exercise #5: Foam roller: lat stretch bilateral and unilateral ams   Exercise #6: 1/2 kneel thoraic rotation at wall   Comment #6: thread the needle     Cervical ROM:             Date: 1/28/2019  3/8/19    *Indicate scale AROM AROM AROM   Cervical Flexion (45) WLF       Cervical Extension (45) WFL - pain   WFL slightly less pain        Right Left Right Left Right  Left   Cervical Sidebending (45) 45 pain on L  38 pain on L  50 tightness on L   55 tightness on R - slight pain along spine, tingling       Cervical Rotation (60-80)  70 60 pain on L  76  68 no P         Nerve glide testing negative bilaterally for ulnar and median nerves.     Treatment Today     TREATMENT MINUTES COMMENTS   Evaluation     Self-care/ Home management     Manual therapy 54 Supine: nerve glides median and ulnar left   Pec major STM, cervical spinal erectors L>R, occipital release   Prone: infraspinatus bilaterally,  supraspinatus, UT, rhomoids, spinal erectors (L>R), wrist extensors   Cupping along spinal erectors (thoracic/cervical), rhomboid, UT    Mobs: ribs and thoracic spine grade IV    Neuromuscular Re-education     Therapeutic Activity     Therapeutic Exercises 2 See ex's flow sheet: wrist extensor stretch    Gait training     Modality__________________                Total 56    Blank areas are intentional and mean the treatment did not include these items.       Edilma Mcmullen, PT, DPT  4/19/2019

## 2021-05-28 NOTE — PROGRESS NOTES
[facility] Daily Progress     Patient Name: Hugo Robles  Date: 5/10/2019   Visit #: 13/14 (changed POC - increased to 14 sessions to continue to address goals)   PTA visit #:    Referral Diagnosis:Neck pain on left side, myofascial pain   Referring provider: Ellie Foster C*  Visit Diagnosis:     ICD-10-CM    1. Neck pain on left side M54.2    2. Musculoskeletal disorder involving upper trapezius muscle M53.82      Precautions: palpitations. 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 year old male with chronic mild to moderate left-sided neck pain C3-4 region with pain into left trapezius.  Patient has difficulty with studying, reading, writing and lifting heavy upper body weights. Findings are consistent with facet mediated pain and cervical/upper back myofascial pain.  Pt presents with positive median and ulnar nerve testing, along with painful AROM SB and Rot to left.    Patient demonstrates understanding/independence with home program.  Patient is benefitting from skilled physical therapy and is making steady progress toward functional goals.  Patient is appropriate to continue with skilled physical therapy intervention, as indicated by initial plan of care.    Goal Status:  Pt. will demonstrate/verbalize independence in self-management of condition in : 12 weeks Progressing  Pt. will be independent with home exercise program in : 6 weeks MET  Pt. will improve posture : and maintain posture for;Comment;in 6 weeks MET  Comment:: studying with lumbar support and less neck pain   Patient Turn Head: for other;with full ROM;wiith no pain;in 12 weeks Progressing     Pt will: return to lifting heavy weights without pain in 12 weeks to allow for increased QOL. Progressing  No data recorded  No data recorded    Plan / Patient Education:      discharge      Subjective:   Neck feels good. Less stress, has not been studying. Is moving on Tuesday, will discharge today.     Objective:   Exercises:  Exercise #1: chin tuck in sitting and supine - chin tuck and lift 10 sec x5-8 reps   Comment #1: foam roller: horizontal and vertical, chest openner and upper back self-massage and facet joint mobility  Exercise #2: Nerve glides: median nerve tensioners and rockers, ulnar nerve butterfly x10-15   Comment #2: SNAGS   Exercise #3: I's, W's in prone 10 sec hold x10 - on therapeutic ball added T's   Comment #3: Serratus: punches with 12# 2X10-15, push up + in plank, with and without bosu (1/2 push-up with push-up +)   Exercise #4: Dynamic plank on ex's ball and on bosu, plank with weight to avoid wrist pain   Comment #4: Discussed RC strengthing pt is performing IR, ER with band - added scaption with 2# high repetition   Exercise #5: Foam roller: lat stretch bilateral and unilateral ams   Exercise #6: 1/2 kneel thoraic rotation at wall   Comment #6: thread the needle     Cervical ROM:             Date: 1/28/2019  3/8/19 5/10/19   *Indicate scale AROM AROM AROM   Cervical Flexion (45) WLF       Cervical Extension (45) WFL - pain   WFL slightly less pain   increased ROM not painful. Right      Right Left Right Left Right Left   Cervical Sidebending (45) 45 pain on L  38 pain on L  50 tightness on L   55 tightness on R - slight pain along spine, tingling 50     85 Rot 58     70 Rot no pain    Cervical Rotation (60-80)  70 60 pain on L  76  68 no P       Overall improved thoracic and rib mobility.     Nerve glides: median and ulnar nerve / positive median nerve on L     Treatment Today     TREATMENT MINUTES COMMENTS   Evaluation     Self-care/ Home management     Manual therapy 53 Supine: wrist extensors    STM, cervical spinal erectors L>R, occipital release   Prone: infraspinatus bilaterally,  supraspinatus, UT, rhomoids, spinal erectors (L>R)  Cupping alongspinal  erectors (thoracic/cervical/lumbar), rhomboid, UT    Mobs: thoracic spine grade IV    Neuromuscular Re-education     Therapeutic Activity     Therapeutic Exercises     Gait training     Modality__________________                Total 53    Blank areas are intentional and mean the treatment did not include these items.       Edilma Mcmullen, PT, DPT  5/10/2019     [facility] Discharge Summary  Patient Name: Hugo Robles  Date: 5/10/2019  Referral Diagnosis: Neck pain on left side, myofascial pain   Referring provider: Ellie Foster C*  Visit Diagnosis:   1. Neck pain on left side     2. Musculoskeletal disorder involving upper trapezius muscle         Goals:MET   Pt. will demonstrate/verbalize independence in self-management of condition in : 12 weeks Progressing  Pt. will be independent with home exercise program in : 6 weeks MET  Pt. will improve posture : and maintain posture for;Comment;in 6 weeks MET  Comment:: studying with lumbar support and less neck pain   Patient Turn Head: for other;with full ROM;wiith no pain;in 12 weeks Progressing   Pt will: return to lifting heavy weights without pain in 12 weeks to allow for increased QOL. Progressing    Patient was seen for 13 visits from 1/28/19 to 5/10/19 with 0 missed appointments.  The patient met goals and has demonstrated understanding of and independence in the home program for self-care, and progression to next steps.  He will initiate contact if questions or concerns arise.    Therapy will be discontinued at this time.  The patient will need a new referral to resume.    Thank you for your referral.  Edilma Mcmullen, PT, DPT  5/10/2019  8:35 AM

## 2021-05-28 NOTE — PROGRESS NOTES
[facility] Daily Progress     Patient Name: Hugo Rboles  Date: 5/3/2019   Visit #: 12/14 (changed POC - increased to 14 sessions to continue to address goals)   PTA visit #:    Referral Diagnosis:Neck pain on left side, myofascial pain   Referring provider: Ellie Foster C*  Visit Diagnosis:     ICD-10-CM    1. Neck pain on left side M54.2    2. Musculoskeletal disorder involving upper trapezius muscle M53.82      Precautions: palpitations. 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 year old male with chronic mild to moderate left-sided neck pain C3-4 region with pain into left trapezius.  Patient has difficulty with studying, reading, writing and lifting heavy upper body weights. Findings are consistent with facet mediated pain and cervical/upper back myofascial pain.  Pt presents with positive median and ulnar nerve testing, along with painful AROM SB and Rot to left.    Patient demonstrates understanding/independence with home program.  Patient is benefitting from skilled physical therapy and is making steady progress toward functional goals.  Patient is appropriate to continue with skilled physical therapy intervention, as indicated by initial plan of care.    Goal Status:  Pt. will demonstrate/verbalize independence in self-management of condition in : 12 weeks Progressing  Pt. will be independent with home exercise program in : 6 weeks MET  Pt. will improve posture : and maintain posture for;Comment;in 6 weeks MET  Comment:: studying with lumbar support and less neck pain   Patient Turn Head: for other;with full ROM;wiith no pain;in 12 weeks Progressing     Pt will: return to lifting heavy weights without pain in 12 weeks to allow for increased QOL. Progressing  No data recorded  No data recorded    Plan / Patient Education:      Continue with initial plan of care.  Progress with home program as tolerated.     Plan for next session:  STM     Subjective:   Less stress, less studying.  85% better.  Has been working out. Continues to have L neck pain but not as severe. Continues to have tingling in upper back. Right wrist is feeling better.     Objective:   Exercises:  Exercise #1: chin tuck in sitting and supine - chin tuck and lift 10 sec x5-8 reps   Comment #1: foam roller: horizontal and vertical, chest openner and upper back self-massage and facet joint mobility  Exercise #2: Nerve glides: median nerve tensioners and rockers, ulnar nerve butterfly x10-15   Comment #2: SNAGS   Exercise #3: I's, W's in prone 10 sec hold x10 - on therapeutic ball added T's   Comment #3: Serratus: punches with 12# 2X10-15, push up + in plank, with and without bosu (1/2 push-up with push-up +)   Exercise #4: Dynamic plank on ex's ball and on bosu, plank with weight to avoid wrist pain   Comment #4: Discussed RC strengthing pt is performing IR, ER with band - added scaption with 2# high repetition   Exercise #5: Foam roller: lat stretch bilateral and unilateral ams   Exercise #6: 1/2 kneel thoraic rotation at wall   Comment #6: thread the needle     Cervical ROM:             Date: 1/28/2019  3/8/19    *Indicate scale AROM AROM AROM   Cervical Flexion (45) WLF       Cervical Extension (45) WFL - pain   WFL slightly less pain        Right Left Right Left Right Left   Cervical Sidebending (45) 45 pain on L  38 pain on L  50 tightness on L   55 tightness on R - slight pain along spine, tingling       Cervical Rotation (60-80)  70 60 pain on L  76  68 no P       Overall improved thoracic and rib mobility.       Treatment Today     TREATMENT MINUTES COMMENTS   Evaluation     Self-care/ Home management     Manual therapy 53 Supine: wrist extensors    STM, cervical spinal erectors L>R, occipital release   Prone: infraspinatus bilaterally,  supraspinatus, UT,  rhomoids, spinal erectors (L>R)  Cupping along wrist extensors and spinal erectors (thoracic/cervical), rhomboid, UT    Mobs: thoracic spine grade IV    Neuromuscular Re-education     Therapeutic Activity     Therapeutic Exercises     Gait training     Modality__________________                Total 53    Blank areas are intentional and mean the treatment did not include these items.       Edilma Mcmullen, PT, DPT  5/3/2019

## 2021-05-28 NOTE — PROGRESS NOTES
[facility] Daily Progress     Patient Name: Hugo Robles  Date: 4/26/2019   Visit #: 11/12   PTA visit #:    Referral Diagnosis:Neck pain on left side, myofascial pain   Referring provider: Ellie Foster C*  Visit Diagnosis:     ICD-10-CM    1. Neck pain on left side M54.2    2. Musculoskeletal disorder involving upper trapezius muscle M53.82      Precautions: palpitations. 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  Would recommend pt continue therapy for 4-5 more sessions to continue addressing left cervical pain.  Pt has been compliant with HEP overall.  Residual shoulder discomfort from surgery 10+ years ago and neural tension on L side has significantly improved.  Pt has not had a severe headache in 4 weeks.      From Evaluation: Pt is a 34 y.o. year old male with chronic mild to moderate left-sided neck pain C3-4 region with pain into left trapezius.  Patient has difficulty with studying, reading, writing and lifting heavy upper body weights. Findings are consistent with facet mediated pain and cervical/upper back myofascial pain.  Pt presents with positive median and ulnar nerve testing, along with painful AROM SB and Rot to left.    Patient demonstrates understanding/independence with home program.  Patient is benefitting from skilled physical therapy and is making steady progress toward functional goals.  Patient is appropriate to continue with skilled physical therapy intervention, as indicated by initial plan of care.    Goal Status:  Pt. will demonstrate/verbalize independence in self-management of condition in : 12 weeks  Pt. will be independent with home exercise program in : 6 weeks  Pt. will improve posture : and maintain posture for;Comment;in 6 weeks  Comment:: studying with lumbar support and less neck pain   Patient Turn  Head: for other;with full ROM;wiith no pain;in 12 weeks    Pt will: return to lifting heavy weights without pain in 12 weeks to allow for increased QOL.       Plan / Patient Education:     Continue with initial plan of care.  Progress with home program as tolerated.     Plan for next session:  STM - focus on wrist extensors with STM and cupping     Subjective:   No headaches reported.  Overall L shoulder has improved.  Continues to have L neck pain and occasionally tingling in upper back (left side).  Moderate amount of stress with finishing up school.  Continues to monitor posture and ergonomic set-up with studying.   Has been having right wrist pain.     Objective:   Exercises:  Exercise #1: chin tuck in sitting and supine - chin tuck and lift 10 sec x5-8 reps   Comment #1: foam roller: horizontal and vertical, chest openner and upper back self-massage and facet joint mobility  Exercise #2: Nerve glides: median nerve tensioners and rockers, ulnar nerve butterfly x10-15   Comment #2: SNAGS   Exercise #3: I's, W's in prone 10 sec hold x10 - on therapeutic ball added T's   Comment #3: Serratus: punches with 12# 2X10-15, push up + in plank, with and without bosu (1/2 push-up with push-up +)   Exercise #4: Dynamic plank on ex's ball and on bosu, plank with weight to avoid wrist pain   Comment #4: Discussed RC strengthing pt is performing IR, ER with band - added scaption with 2# high repetition   Exercise #5: Foam roller: lat stretch bilateral and unilateral ams   Exercise #6: 1/2 kneel thoraic rotation at wall   Comment #6: thread the needle     Cervical ROM:             Date: 1/28/2019  3/8/19    *Indicate scale AROM AROM AROM   Cervical Flexion (45) WLF       Cervical Extension (45) WFL - pain   WFL slightly less pain        Right Left Right Left Right Left   Cervical Sidebending (45) 45 pain on L  38 pain on L  50 tightness on L   55 tightness on R - slight pain along spine, tingling       Cervical Rotation (60-80)   70 60 pain on L  76  68 no P         Nerve glide testing negative on R for median nerve.  L ulnar nerve negative but median nerve positive.  Increased PROM with median nerve testing but continues to be painful at end range.        Treatment Today     TREATMENT MINUTES COMMENTS   Evaluation     Self-care/ Home management     Manual therapy 59 Supine: nerve glides median on left, wrist extensors    STM, cervical spinal erectors L>R, occipital release   Prone: infraspinatus bilaterally,  supraspinatus, UT, rhomoids, spinal erectors (L>R)  Cupping along spinal erectors (thoracic/cervical), rhomboid, UT    Mobs: ribs and thoracic spine grade IV    Neuromuscular Re-education     Therapeutic Activity     Therapeutic Exercises     Gait training     Modality__________________                Total 59    Blank areas are intentional and mean the treatment did not include these items.       Edilma Mcmullen, PT, DPT  4/26/2019

## 2021-05-30 VITALS — HEIGHT: 68 IN | WEIGHT: 152 LBS | BODY MASS INDEX: 23.04 KG/M2

## 2021-05-30 VITALS — BODY MASS INDEX: 23.04 KG/M2 | WEIGHT: 152 LBS | HEIGHT: 68 IN

## 2021-05-31 VITALS — WEIGHT: 157.04 LBS | BODY MASS INDEX: 23.8 KG/M2 | HEIGHT: 68 IN

## 2021-05-31 VITALS — HEIGHT: 68 IN | WEIGHT: 150 LBS | BODY MASS INDEX: 22.73 KG/M2

## 2021-05-31 VITALS — HEIGHT: 68 IN | WEIGHT: 155 LBS | BODY MASS INDEX: 23.49 KG/M2

## 2021-05-31 VITALS — BODY MASS INDEX: 24.3 KG/M2 | WEIGHT: 157.5 LBS

## 2021-06-01 VITALS — WEIGHT: 169 LBS | BODY MASS INDEX: 25.61 KG/M2 | HEIGHT: 68 IN

## 2021-06-01 VITALS — BODY MASS INDEX: 24.4 KG/M2 | WEIGHT: 161 LBS | HEIGHT: 68 IN

## 2021-06-02 VITALS — HEIGHT: 68 IN | WEIGHT: 167 LBS | BODY MASS INDEX: 25.31 KG/M2

## 2021-06-02 VITALS — WEIGHT: 163 LBS | BODY MASS INDEX: 24.71 KG/M2 | HEIGHT: 68 IN

## 2021-06-02 VITALS — WEIGHT: 162 LBS | BODY MASS INDEX: 24.55 KG/M2 | HEIGHT: 68 IN

## 2021-06-02 VITALS — WEIGHT: 159 LBS | BODY MASS INDEX: 24.54 KG/M2

## 2021-06-09 NOTE — PROGRESS NOTES
[facility] - [facility]  Hugo Robles,  1985, MRN [id number]    PCP: No Primary Care Provider, None    Assessment and Plan: Palpitations, tachycardia, and presyncope.  Etiology of present is unclear.  No past history of the symptom or anything similar.  No history of cardiac abnormality.  Will check ECG today and arrange for Holter monitor study.  Recommendations: For now we will arrange for 24-hour Holter monitor and cardiac echo.  At present I do not have a good explanation for his event.  I am suspicious that he may have paroxysmal AV mario reentrant tachycardia.    Chief Complaint: Tachycardia    HPI:  We have been requested by ER to evaluate Hugo Robles for consultation who is a  32 y.o. year old male for above 32-year-old man who is new time patient to our clinic.  Has no primary..   Hx: Patient has had a new onset of a new symptom where he reports palpitations.  He was in a poultry competition was seated at the time when he began to experience a new onset of rapid tachycardia.  He was aware of palpitations of the chest.  He felt like he was becoming progressively lightheaded and weak almost to the point of blacking out of the did not have syncope.  The entire episode lasted about 20 minutes.  He went to the emergency room.  By that time he was back in normal rhythm.  He felt fatigued for a while and then symptoms resolve completely.  The ER assessment was noncontributory.  Mild hypokalemia and a minimal borderline elevation in TSH noted but all other laboratories appeared normal.  ECG apparently normal at all I do not have tracing to review.  No other studies were done at that time.    Medical History  Active Ambulatory (Non-Hospital) Problems    Diagnosis     Palpitation     Tachycardia     Pre-syncope     No past medical history on file.    Surgical History  He  has no past surgical history on file.    Social History  Reviewed, and he  reports that he has never smoked. He does not  "have any smokeless tobacco history on file.  Smoking status reviewed.  Social history othrwise not contributory to HPI.  Allergies  No Known Allergies    Family History  Reviewed, and family history is not on file.  Extended Emergency Contact Information  Primary Emergency Contact: Barb Eason   [facility]  Home Phone: [phone]  Relation: Significant Other  Secondary Emergency Contact: Nelly Robles   [facility]  Home Phone: [phone]  Relation: Mother  Family history otherwise negative or not conributory to HPI.    Psychosocial Needs  Social History     Social History Narrative     No narrative on file     Additional psychosocial needs reviewed per nursing assessment.    Prior to Admission Medications    (Not in a hospital admission)    Review of Systems:  Pertinent items are noted in HPI.  Review of systems is negative except for HPI  Physical Exam:  [unfilled]    /68 (Patient Site: Left Arm, Patient Position: Sitting, Cuff Size: Adult Large)  Pulse 64  Resp 16  Ht 5' 8\" (1.727 m)  Wt 152 lb (68.9 kg)  BMI 23.11 kg/m2  Alert oriented.  No acute distress.  JVD is nondistended.  Carotid upstrokes normal without bruit.  No cervical lymphadenopathy or thyromegaly.  Heart tones S1-S2 normal distinct and regular without extra sounds no S3-S4 or murmur.  Breath sounds are clear.  Epigastric bowel tones normal.  Skin warm and dry.  Pertinent Labs  Lab Results: personally reviewed.   No results found for: NA, K, CL, CO2, BUN, CREATININE, GLUCOSE, CALCIUM  No results found for: CKTOTAL, CKMB, CKMBINDEX, TROPONINI  No results found for: WBC, HGB, HCT, MCV, PLT  No results found for: CHOL, TRIG, HDL, LDLDIRECT    Pertinent Radiology  Radiology Results: See Report  EKG Results: personally reviewed.  and See Report                   "

## 2021-06-10 NOTE — PROGRESS NOTES
OFFICE VISIT NOTE  Hugo Robles   32 y.o. male            Assessment/Plan for  Hugo Robles is a 32 y.o. male.  No Patient Care Coordination Note on file.       There are no diagnoses linked to this encounter.   1.  Healthy man healthy lifestyle  2.  Episode of near syncope with negative cardiovascular workup including echo Holter.  Asymptomatic  3.  Nonfasting hyperglycemia-130    Plan:  Check fasting blood sugar  Complete baseline lab including lipid liver blood sugar-scheduled    There are no Patient Instructions on file for this visit.  Patient desires    Diagnoses and all orders for this visit:    Preventative health care  -     Hepatic Profile; Future; Expected date: 5/22/17  -     Lipid Cascade; Future; Expected date: 5/22/17  -     Urinalysis-UC if Indicated; Future; Expected date: 5/22/17  -     Glucose              Bert Calloway MD  Internal medicine  [facility]  [phone]  [email or web address]      This is an electronically verified report by Bert Calloway M.D.  (Note created with Dragon voice recognition and unintended spelling errors and word substitutions may occur)               Subjective:   Chief Complaint:  Establish Care (non fasting) and Cerumen Impaction (Mostly left sided)    Examination establishment of care  Healthy man  Orthopedic injury otherwise negative health history  Recent evaluation for near syncope.  Including emergency room visit cardiology visit Holter monitor cardiac echo laboratory  Family history unremarkable    Non-smoker rare alcohol low-carb diet.  Athletic.     nonprofit for 4 years.  No serious illness    Review of Systems:     Extensive 10-point review of systems was performed. Please see the HPI for problem specific pertinent review of systems.     Patient does note good appetite.  Active.  Regular bowel movement    Otherwise, the following systems are noncontributory including constitutional, eyes, ears, nose and throat,  "cardiovascular, respiratory, gastrointestinal, genitourinary, musculoskeletal,neurological, skin and/or breast, endocrine, hematologic/lymph, allergic/immunologic and psychiatric.              Medications:  Current Outpatient Prescriptions   Medication Sig Dispense Refill     CASEIN ORAL Take by mouth 2 (two) times a day.       cetirizine (ZYRTEC) 10 MG tablet Take 10 mg by mouth daily.       OMEGA-3/DHA/EPA/FISH OIL (FISH OIL-OMEGA-3 FATTY ACIDS) 300-1,000 mg capsule Take 2 g by mouth daily.       No current facility-administered medications for this visit.      No current outpatient prescriptions on file prior to visit.     No current facility-administered medications on file prior to visit.        Allergies:No Known Allergies    PSFHx: Tobacco Status:  He  reports that he has never smoked. He does not have any smokeless tobacco history on file.   Alcohol Status:    History   Alcohol Use No     Comment: rare       reports that he has never smoked. He does not have any smokeless tobacco history on file. He reports that he does not drink alcohol or use illicit drugs.    Objective:    /66 (Patient Site: Right Arm, Patient Position: Sitting, Cuff Size: Adult Regular)  Pulse (!) 55  Resp 14  Ht 5' 7.5\" (1.715 m)  Wt 157 lb 0.6 oz (71.2 kg)  SpO2 99%  BMI 24.23 kg/m2  Weight:   Wt Readings from Last 3 Encounters:   05/22/17 157 lb 0.6 oz (71.2 kg)   04/07/17 152 lb (68.9 kg)   03/28/17 152 lb (68.9 kg)     BP Readings from Last 3 Encounters:   05/22/17 110/66   04/07/17 104/68   03/28/17 104/68         General-appears well, no acute distress.  Skin: Normal. No rash or lesion  Lymph Nodes: None palpable-including neck, axilla, inguinal, epitrochlear.  Head:  Normocephalic.    Eyes: Midline.  Equal size., full ROM.  External exams normal.  No icterus.  Contact lenses  Ears:  Normal pinnae, canals, and TM's.    Nose:  Patent, without deformity.    Throat:  Moist mucous membranes without lesions, erythema, or " exudate.    Neck: No palpable masses, lymphadenopathy or tenderness.No thyromegaly or goiter.  No thyroid nodule.  Carotid Arteries:  No Bruit.  Carotid upstroke normal  Chest Wall: No deformity or pain elicited on compression.  Respiratory:  Normal respiratory effort.  Lungs are clear with good breath sounds.  No dullness.  No wheezing.  Heart: Regular rhythm.  Normal sounding S1, S2 without S3, S4, murmurs, rubs, or gallops.    Abdomen:  The abdomen was flat, soft and nontender without guarding rebound or masses.  There are normal bowel sounds.  There is no hepatosplenomegaly.  There is no palpable enlargement of the aorta.  Genitalia:  Circumcised male without penile or testicular lesions.  No hernia.  Extremities:  Full ROM without limitation, deformity or edema.    4+ pulses  Neurologic-intact- No focal deficit.  Speech clear.  Coordination normal.  Strength symmetric  Orthopedic-no arthropathy.        Review of clinical lab tests      [facility]- tsh sligh high at 4.3  Random glucose  130     Normal cbc  [facility]    No results found for this or any previous visit (from the past 24 hour(s)).    RADIOLOGY: No results found.    Review of recent consultation-   HOLTER MONITOR      IMPRESSION:  1.  A 24-hour Holter monitor was applied 04/07/2017 with date of interpretation  04/13/2017.  2.  Predominantly sinus rhythm is present with an average heart rate of 68 beats per  minute with normal electrocardiographic intervals.  3.  Heart rate ranges between 34 and 122 beats per minute.  4.  Some ectopic atrial rhythms are also seen generally when there is more of  bradycardia in nature, especially at night.  5.  No ventricular ectopy.  6.  Rare supraventricular ectopy, with 323 premature atrial complexes (PACs).  There  was no atrial tachycardia or atrial fibrillation.  7.  No paroxysmal bradycardia, tachycardia, or high grade heart block.  The longest  pause of 1.9 seconds occurs at 11:32 p.m.      DISCUSSION:  A tendency towards  sinus bradycardia and some alternative atrial foci  seen, generally at slower rates.  Patient activity diary disclosed no symptoms.        TIFFANIE MAYA 04/13/2017 09:01:36  T 04/13/2017 09:42:34  R 04/13/2017 09:42:34  [id number]         Findings   Left Ventricle  Normal size and systolic function.The estimated left ventricular ejection fraction is 60%. This represents a normal ejection fraction. The left ventricular wall thickness is normal. E/e' < 8, suggesting normal LV filling pressure.   Wall Scoring     Resting               Note review    Hx: Patient has had a new onset of a new symptom where he reports palpitations.  He was in a poultry competition was seated at the time when he began to experience a new onset of rapid tachycardia.  He was aware of palpitations of the chest.  He felt like he was becoming progressively lightheaded and weak almost to the point of blacking out of the did not have syncope.  The entire episode lasted about 20 minutes

## 2021-06-11 NOTE — PROGRESS NOTES
"  Office Visit - Follow Up   Hugo Robles   32 y.o. male    Date of Visit: 6/23/2017    Chief Complaint   Patient presents with     Coping Issue     Going through rough time, Buspar has worked in the past, would like something to help him cope - Needs TB test     Allergy issues     Taking Zrytec, would like to get a rx for Sertrzine        Assessment and Plan   1. Tuberculosis screening    - TB Skin Test    2. GIOVANNA (generalized anxiety disorder)  We talked about using butyrin or an SSRI just as needed Lorazepam.  He has been on BuSpar in the past and would like to start this medication.  See me or Dr. Calloway in follow-up as needed in the next 2-4 weeks        No Follow-up on file.     History of Present Illness   This 32 y.o. old has recently ended a relationship with his girlfriend.  For the last 2-3 weeks he cannot sleep his appetite is been diminished.  He has obsessive thoughts about how the relationship did not work.  He states that they had long discussions about their long-term relationship and both were mostly ready to ended but she promptly decided to leave.  She battles depression.  She was also the main provider for his rent payments.  He only works part-time at [employer].  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  Is concerned about finances also and this contributes to his anxiety.  He reports no significant suicidal thoughts.  He does not think he is very depressed he just feels anxious.  In the past he has had BuSpar for anxiety and this has worked well for him.  Talked about other options as above.  He also is seeing a psychologist to help him through this.  Review of Systems: A comprehensive review of systems was negative except as noted.     Medications, Allergies and Problem List   Reviewed and updated     Physical Exam   General Appearance:       /68 (Patient Site: Right Arm, Patient Position: Sitting)  Pulse 63  Ht 5' 7.5\" (1.715 m)  Wt 150 lb (68 kg)  SpO2 98%  BMI " 23.15 kg/m2    He is alert and oriented.  His mood seems appropriate.  Good eye contact.  He is not suicidal      Additional Information   Current Outpatient Prescriptions   Medication Sig Dispense Refill     CASEIN ORAL Take by mouth 2 (two) times a day.       cetirizine (ZYRTEC) 10 MG tablet Take 1 tablet (10 mg total) by mouth daily. 90 tablet 3     OMEGA-3/DHA/EPA/FISH OIL (FISH OIL-OMEGA-3 FATTY ACIDS) 300-1,000 mg capsule Take 2 g by mouth daily.       busPIRone (BUSPAR) 7.5 MG tablet Take 1 tablet (7.5 mg total) by mouth 2 (two) times a day. 60 tablet 2     No current facility-administered medications for this visit.      No Known Allergies  Social History   Substance Use Topics     Smoking status: Never Smoker     Smokeless tobacco: None     Alcohol use No      Comment: rare       Review and/or order of clinical lab tests:  Review and/or order of radiology tests:  Review and/or order of medicine tests:  Discussion of test results with performing physician:  Decision to obtain old records and/or obtain history from someone other than the patient:  Review and summarization of old records and/or obtaining history from someone other than the patient and.or discussion of case with another health care provider:  Independent visualization of image, tracing or specimen itself:    Time: total time spent with the patient was 15 minutes of which >50% was spent in counseling and coordination of care     Raad Hathaway MD

## 2021-06-12 NOTE — PROGRESS NOTES
"OFFICE VISIT NOTE    Subjective:   Chief Complaint:  Back Pain (was lifting weighs/squatting and felt a sharp pain in left SI Joint 2 days ago. he has been in alot of pain since and hasn't been able to work. had same injury 4 years ago. )    32-year-old man is doing some lifting of weights about 2 days ago when he felt a pop and discomfort and points to the left sacroiliac region.  He had similar injury 4 years ago he had discomfort for several months.  He applied ice first several hours he still having a lot of pain and spasm in the low back.  There is no weakness of the leg.  There is no radiation into the legs.  Has no loss of bowel or bladder function.    Current Outpatient Prescriptions   Medication Sig     CASEIN ORAL Take by mouth 2 (two) times a day.     cetirizine (ZYRTEC) 10 MG tablet Take 1 tablet (10 mg total) by mouth daily.     methylPREDNISolone (MEDROL, YASMANI,) 4 mg tablet follow package directions     OMEGA-3/DHA/EPA/FISH OIL (FISH OIL-OMEGA-3 FATTY ACIDS) 300-1,000 mg capsule Take 2 g by mouth daily.       Review of Systems:  A comprehensive review of systems is negative except for the comments above    Objective:    Pulse 65  Ht 5' 7.5\" (1.715 m)  Wt 155 lb (70.3 kg)  SpO2 98%  BMI 23.92 kg/m2  GENERAL: No acute distress.  He appears to be in excellent health.  He is thin and nourished.  He has difficulty getting out of a chair because of low back spasm.  Pain especially felt the left sacroiliac region.  Reflexes are normal.  Straight leg raising is normal.  He can walk on heels and toes without difficulty.  No spine tenderness.    Assessment & Plan   Hugo Robles is a 32 y.o. male.    The low back.  He felt a pop when he was lifting 200 pounds and doing a squat.  I suspect he is a muscular ligamentous injury in the sacroiliac region.  I did place him on a Medrol Dosepak.  As needed use of Advil.  Advised whirlpool once or twice daily.  Anticipate 1-2 weeks of discomfort yet.    Diagnoses and " all orders for this visit:    Lumbosacral strain, initial encounter  -     methylPREDNISolone (MEDROL, YASMANI,) 4 mg tablet; follow package directions  Dispense: 21 tablet; Refill: 0        Eduardo Fernandes MD  Transcription using voice recognition software, may contain typographical errors.

## 2021-06-13 NOTE — PROGRESS NOTES
Chief Complaint   Patient presents with     Insect Bite     Right inner upper arm, Redness is starting to spread, x 1 day        HPI    Patient is here for bug bites to right upper arm occurred today. He woke up with the lesions, mild itching, no pain. There has been spreading redness around two lesions. NO fever, chills, throat/lip swelling. He had similar issues 6 wks ago and was treated successfully with oral antibiotics. He takes Zyrtec daily for allergies.    ROS: Pertinent ROS noted in HPI.     No Known Allergies    Patient Active Problem List   Diagnosis     Palpitation     Tachycardia     Pre-syncope       Family History   Problem Relation Age of Onset     Hypertension Father      Coronary artery disease Paternal Grandfather        Social History     Social History     Marital status: Single     Spouse name: N/A     Number of children: N/A     Years of education: N/A     Occupational History     Not on file.     Social History Main Topics     Smoking status: Never Smoker     Smokeless tobacco: Never Used     Alcohol use No      Comment: rare     Drug use: No     Sexual activity: Not on file     Other Topics Concern     Not on file     Social History Narrative         Objective:    Vitals:    09/24/17 1457   BP: 118/58   Pulse: 73   Temp: 98.5  F (36.9  C)   SpO2: 98%       Gen:NAD  Skin: There are two bug bite lesions at right medial upper arm, one with a 5 cm x 2 cm area of erythema extending from it, and another one is surrounded by 2 cm area of erythema. No fluctuance.        Cellulitis of right upper arm  -     cephalexin (KEFLEX) 500 MG capsule; Take 1 capsule (500 mg total) by mouth 4 (four) times a day for 10 days.    Bug bites, initial encounter  -     triamcinolone (KENALOG) 0.1 % cream; Apply to affected areas two times daily      Supportive cares and f/u as directed.

## 2021-06-17 NOTE — PATIENT INSTRUCTIONS - HE
"Patient Instructions by Edilma Mcmullen PT at 3/22/2019  1:30 PM     Author: Edilma Mcmullen PT Service: -- Author Type: Physical Therapist    Filed: 3/22/2019  1:37 PM Encounter Date: 3/22/2019 Status: Signed    : Edilma Mcmullen PT (Physical Therapist)        HALF KNEEL THORACIC ROTATION      While in a half kneeling position with your front leg against a wall, tuck your pelvis under until you feel a stretch down the front of the thigh. Maintain this position and put both arms shoulder height in front of you, open one arm and rotate your head and trunk.       THORACIC EXTENSION+ROTATION    - Inside knee is forward  - Rotate with Inner hand in large [place] and then side bend and reach with other hand  - Return to start            Lat stretch on foam roller / can perform on one side at a time     Start on your hands and knees with your hands on a foam roller. Lean forward to allow the foam roller to roll forward until your forearms are on it and your shoulders are fully flexed forward  for a stretch      Thread and reach    Thread the needle from a tabletop position by reaching moving arm behind stabilizing arm until you feel a nice stretch.  From this position, \"unthread\" and reach moving arm toward the nat. Repeat.                      "

## 2021-06-17 NOTE — PATIENT INSTRUCTIONS - HE
Patient Instructions by Edilma Mcmullen PT at 1/28/2019  1:30 PM     Author: Edilma Mcmullen PT Service: -- Author Type: Physical Therapist    Filed: 1/28/2019  2:17 PM Encounter Date: 1/28/2019 Status: Addendum    : Edilma Mcmullen PT (Physical Therapist)    Related Notes: Original Note by Edilma Mcmullen PT (Physical Therapist) filed at 1/28/2019  2:17 PM        Upper Cervical Rotation Self Mobilization     With your arms crossed hold the towel firmly to your chest and the other hand has the towel pressed against your check bone. Pull the towel across your cheekbone with the towel doing the work and your neck feeling the stretch       Look at your hand   Extend your arm out to the side, slightly backward and down   Follow your hand with your eyes   Get a nice, easy stretch (may get a few tingles...which is OK)   Return the hand to the front of the face       Place your palms against each other, fingers pointing to the nat   Push your arms to the left as far as you can   Push your arms to the right as far as you can   Repeat this process

## 2021-06-17 NOTE — PATIENT INSTRUCTIONS - HE
Patient Instructions by Edilma Mcmullen PT at 2/15/2019  9:30 AM     Author: Edilma Mcmullen PT Service: -- Author Type: Physical Therapist    Filed: 2/15/2019 10:28 AM Encounter Date: 2/15/2019 Status: Signed    : Edilma Mcmullen PT (Physical Therapist)        CHIN TUCK - SUPINE    While lying on your back, tuck your chin towards your chest and press the back of your head into the table.    Maintain contact of head with the surface you are lying on the entire time.    RETRACTION / CHIN TUCK    Slowly draw your head back so that your ears line up with your shoulders.            CHIN TUCK HEAD LIFT  Perform chin tuck and then raise head up 1-2 inches off surface.  Slowly lower head raise and then relax chin tuck.     Ulnar Nerve: Butterfly    INSTRUCTIONS:  ? Place your hands on the side of your head  ? Elbows face forward  ? Push your elbows backward towards the wall (or bed if you are doing this laying down)  ? Go as far as you can until you feel a nice, easy stretch (you may get a few tingles which is OK)  ? Return elbows forward again  ? Do not hold  ? Repeat _____ times  ? Repeat _____ sets  ? Repeat _____ times per day  NOTE: In very painful neck patients, this exercise should be done in supine (laying down)     Snow Trout    Lay on your back lengthwise on a foam roller, knees bent up.  Slide your hands on the floor up towards your head until you feel a stretch across your pecs.  Keep your hands resting on the floor.        FOAM ROLL - SPINE ROLL    Start by lying on your back with a foam roll under your back.     Place your hands on your stomach and then slowly roll forward and back across your back using your legs as shown.

## 2021-06-17 NOTE — PATIENT INSTRUCTIONS - HE
Patient Instructions by Edilma Mcmullen PT at 2/22/2019  8:30 AM     Author: Edilma Mcmullen PT Service: -- Author Type: Physical Therapist    Filed: 2/22/2019  9:21 AM Encounter Date: 2/22/2019 Status: Signed    : Edilma Mcmullen PT (Physical Therapist)        PRONE RETRACTION EXTENSION - PRONE I    Lying face down with your arms by your side, slowly move your arms upward towards the ceiling as you squeeze your shoulder blades downwards and towards your spine.      PRONE W - BILATERAL - THUMBS UP    Lie face down with your elbow straight and arms out to the side. Next, set your scapula by retracting it towards your spine and downward towards your feet. Then, slowly raise your arms towards the ceiling keeping your elbow straight the entire time as shown.    Your thumbs should be pointed in the upward direction as your arm raises.

## 2021-06-17 NOTE — PATIENT INSTRUCTIONS - HE
Patient Instructions by Edilma Mcmullen PT at 3/1/2019  8:30 AM     Author: Edilma Mcmullen PT Service: -- Author Type: Physical Therapist    Filed: 3/1/2019  9:21 AM Encounter Date: 3/1/2019 Status: Signed    : Edilma Mcmullen PT (Physical Therapist)        FREE WEIGHT - BILATERAL SCAPTION    Hold a free weight in both hands and then raise them both up away from your side in a forward/lateral direction. Your elbows should be straight and the movement should occur in the plane of the scapula or 45 degrees to the side as shown.    Do not let your shoulder shrug upwards unless instructed to go over shoulder level height.               SCAPULAR PROTRACTION - FREE WEIGHT - SERRATUS PUNCHES    Lie on your back holding a small free weight or soup can with your arm extended out in front of your body and towards the ceiling. While keeping your elbow straight, protract your shoulders forward towards the ceiling and then lower back down in a control motion.     Do not allow your shoulder to raise towards your ears.     Keep your elbow straight the entire time.

## 2021-06-18 NOTE — PROGRESS NOTES
Office Visit - Physical   Hugo FRANCESCO Robles   33 y.o.  male in for annual physical examination.  He works full-time and is a student.  He has been in excellent health.    Date of visit: 5/21/2018  Physician: Eduardo Fernandes MD     Assessment and Plan   1. Routine general medical examination at a health care facility  Generally doing very well.  He did have some back issues about a year ago but is doing exercise and now feels great.  He is rarely sick.  He has not missed any work because of illness.  - Lipid Cascade  - Urinalysis-UC if Indicated    2. Fatigue  Thinks may be related to long days working as well as being a student.  - HM1(CBC and Differential)  - Comprehensive Metabolic Panel  - Thyroid Cascade  - Testosterone, Total  - HM1 (CBC with Diff)          No Follow-up on file.     Chief Complaint   Chief Complaint   Patient presents with     Annual Exam     fasting        Patient Profile   Social History     Social History Narrative        Past Medical History   Patient Active Problem List   Diagnosis     Palpitation     Tachycardia     Pre-syncope       Past Surgical History  He has a past surgical history that includes Wrist surgery; Shoulder arthroscopy; and Ankle surgery.     History of Present Illness   This 33 y.o. old male is in for annual physical exam.  He is doing well.  No chest pain or any orthopnea.  An episode of some tachycardia about a year ago.  Nothing has recurred.  No claudication  No asthma wheezing or any cough  No dysphasia.  No history of change in bowel habits.  No blood in the stool.  Urination is normal.  No change in nocturia frequency etc.  No migraine.  No history of tremor.  No history of any epilepsy.  Denies any new musculoskeletal problems.  His back is okay as long as he stays with exercises.    Review of Systems: A comprehensive review of systems was negative except as noted.     Medications and Allergies   Current Outpatient Prescriptions   Medication Sig Dispense Refill  "    b complex vitamins tablet Take 1 tablet by mouth daily.       CASEIN ORAL Take by mouth 2 (two) times a day.       cetirizine (ZYRTEC) 10 MG tablet Take 1 tablet (10 mg total) by mouth daily. 90 tablet 3     CREATINE MONOHYDRATE ORAL Take by mouth.       multivitamin therapeutic tablet Take 1 tablet by mouth daily.       OMEGA-3/DHA/EPA/FISH OIL (FISH OIL-OMEGA-3 FATTY ACIDS) 300-1,000 mg capsule Take 2 g by mouth daily.       No current facility-administered medications for this visit.      No Known Allergies     Family and Social History   Family History   Problem Relation Age of Onset     Hypertension Father      Coronary artery disease Paternal Grandfather         Social History   Substance Use Topics     Smoking status: Never Smoker     Smokeless tobacco: Never Used     Alcohol use No      Comment: rare        Physical Exam   General Appearance:   Very healthy-appearing man in no distress.  Blood pressures 1[birth year]26[birth year]1978    Pulse 63  Ht 5' 7.5\" (1.715 m)  Wt 169 lb (76.7 kg)  SpO2 98%  BMI 26.08 kg/m2    EYES: Eyelids, conjunctiva, and sclera were normal. Pupils were normal. Cornea, iris, and lens were normal bilaterally.  Wears soft contact lenses  HEAD, EARS, NOSE, MOUTH, AND THROAT: Head and face were normal. Hearing was normal to voice and the ears were normal to external exam. Nose appearance was normal and there was no discharge. Oropharynx was normal.  NECK: Neck appearance was normal. There were no neck masses and the thyroid was not enlarged.  No thyroid enlargement.  No bruit  RESPIRATORY: Breathing pattern was normal and the chest moved symmetrically.  Percussion/auscultatory percussion was normal.  Lung sounds were normal and there were no abnormal sounds.  CARDIOVASCULAR: Heart rate and rhythm were normal.  S1 and S2 were normal and there were no extra sounds or murmurs. Peripheral pulses in arms and legs were normal.  Jugular venous pressure was normal.  There was no peripheral " edema.  GASTROINTESTINAL: The abdomen was normal in contour.  Bowel sounds were present.  Percussion detected no organ enlargement or tenderness.  Palpation detected no tenderness, mass, or enlarged organs.   MUSCULOSKELETAL: Skeletal configuration was normal and muscle mass was normal for age. Joint appearance was overall normal.  LYMPHATIC: There were no enlarged nodes.  SKIN/HAIR/NAILS: Skin color was normal.  There were no skin lesions.  Hair and nails were normal.  Several tattoos on his body  NEUROLOGIC: The patient was alert and oriented to person, place, time, and circumstance. Speech was normal. Cranial nerves were normal. Motor strength was normal for age. The patient was normally coordinated.  PSYCHIATRIC:  Mood and affect were normal and the patient had normal recent and remote memory. The patient's judgment and insight were normal.    ADDITIONAL VITAL SIGNS: Pulse 64  CHEST WALL/BREASTS: Normal male  RECTAL: Not checked  GENITAL/URINARY: Normal male     Additional Information        Eduardo Fernandes MD  Internal Medicine  Contact me at [phone]

## 2021-06-19 NOTE — PROGRESS NOTES
[facility] Follow Up Note    Hugo Robles   33 y.o. male    Date of Visit: 7/18/2018    Chief Complaint   Patient presents with     Cough     Tightness in chest, yellow phlegm for 1 week- Went to Urgent Care- Sunday     Subjective  This is a 33-year-old patient of Dr. Bert Calloway who comes in primarily because of symptoms related to a respiratory infection.  The symptoms began last Thursday, nearly a week ago.  Symptoms have included a sore throat and fatigue.  In the past couple of days he has developed a productive cough and some chills as well.  He was feeling bad enough on Sunday of this week that he went to urgent care.  Strep tests were negative and they did not start him on any antibiotic.  Unfortunately, he seems to be getting worse as the week progresses with the development of new symptoms.  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  Otherwise he has been in good health.    ROS A comprehensive review of systems was performed and was otherwise negative    Medications, allergies, and problem list were reviewed and updated    Exam  General Appearance:   On examination his blood pressure was 114/80.  Weight is 161 pounds and height is 67.5 inches.  BMI is 24.84.  Temperature is 98.1.    Lungs are clear.    No enlarged cervical lymph nodes.    Throat is very slightly erythematous but there is no exudate.    Heart is in a sinus rhythm with a rate of 60 and no ectopy.    The patient is alert and oriented ×3.      Assessment/Plan  1. Upper respiratory infection       Respiratory infection.  Given the chills and fatigue and cough and the length of time this is gone I am going to give him a Z-Kameron.  He will follow-up if this is not helping.      Robinson Vinson MD      Current Outpatient Prescriptions on File Prior to Visit   Medication Sig     b complex vitamins tablet Take 1 tablet by mouth daily.     CASEIN ORAL Take by mouth  2 (two) times a day.     cetirizine (ZYRTEC) 10 MG tablet Take 1 tablet (10 mg total) by mouth daily.     CREATINE MONOHYDRATE ORAL Take by mouth.     multivitamin therapeutic tablet Take 1 tablet by mouth daily.     OMEGA-3/DHA/EPA/FISH OIL (FISH OIL-OMEGA-3 FATTY ACIDS) 300-1,000 mg capsule Take 2 g by mouth daily.     No current facility-administered medications on file prior to visit.      No Known Allergies  Social History   Substance Use Topics     Smoking status: Never Smoker     Smokeless tobacco: Never Used     Alcohol use No      Comment: rare

## 2021-06-20 NOTE — PROGRESS NOTES
"Office Visit - Follow up    Hugo Robles   33 y.o. male    Date of Visit: 10/9/2018    Chief Complaint   Patient presents with     Cough     Congestion, chest hurts, mattered eyes this am- Saw Dr Martinez yesterday       Subjective: 1 week history of severe cough producing yellowish phlegm with low-grade fever.  Also has new symptoms of purulent discharge both eyes.    No pre-existing underlying lung disease    Otherwise is healthy without major health concerns    Is frustrated with nocturnal cough.    Was seen in urgent care 3 or 4 days ago treated with symptomatic management including albuterol Tessalon and codeine-based cough syrup.    Noted minimal improvement in fact was worse with increasing symptoms of cough    Low-grade fever as high as 101    Was seen yesterday in the office and notes by Dr. Martinez are reviewed and appreciated.  Since that time he has noted increasing symptoms of cough and producing purulent phlegm.    ROS: A comprehensive review of systems was performed and was otherwise negative except as mentioned above.     Exam  Has bilateral conjunctivitis with a purulent drainage both eyes.  Throat clear lungs diffuse rhonchi wheezes right base   /78  Pulse 75  Temp 98.8  F (37.1  C)  Ht 5' 7.5\" (1.715 m)  Wt 163 lb (73.9 kg)  SpO2 98%  BMI 25.15 kg/m2    Assessment and Plan  Conjunctivitis which will be treated with sulfacetamide.  I did give him a brief course of Augmentin suspect complicating bacterial bronchitis.  Follow-up if not improved with Dr. Thompson    Hugo was seen today for cough.    Diagnoses and all orders for this visit:    Acute conjunctivitis of both eyes    Acute bronchitis    Other orders  -     amoxicillin-clavulanate (AUGMENTIN) 875-125 mg per tablet; Take 1 tablet by mouth 2 (two) times a day for 5 days.  -     sulfacetamide (BLEPH-10) 10 % ophthalmic solution; Administer 2 drops to both eyes 4 (four) times a day for 10 days.          Time: total time spent with the " patient was 15 minutes of which >50% was spent in counseling and coordination of care        No Known Allergies    Medications :  Prior to Admission medications    Medication Sig Start Date End Date Taking? Authorizing Provider   albuterol (PROAIR HFA;PROVENTIL HFA;VENTOLIN HFA) 90 mcg/actuation inhaler Inhale 2 puffs as needed. 10/7/18  Yes PROVIDER, HISTORICAL   b complex vitamins tablet Take 1 tablet by mouth daily.   Yes PROVIDER, HISTORICAL   benzonatate (TESSALON) 100 MG capsule Take 100 mg by mouth 3 (three) times a day as needed for cough.   Yes PROVIDER, HISTORICAL   CASEIN ORAL Take by mouth 2 (two) times a day.   Yes PROVIDER, HISTORICAL   cetirizine (ZYRTEC) 10 MG tablet Take 1 tablet (10 mg total) by mouth daily. 6/29/17  Yes Bert Calloway MD   CREATINE MONOHYDRATE ORAL Take by mouth.   Yes PROVIDER, HISTORICAL   multivitamin therapeutic tablet Take 1 tablet by mouth daily.   Yes PROVIDER, HISTORICAL   OMEGA-3/DHA/EPA/FISH OIL (FISH OIL-OMEGA-3 FATTY ACIDS) 300-1,000 mg capsule Take 2 g by mouth daily.   Yes PROVIDER, HISTORICAL   VIRTUSSIN AC  mg/5 mL liquid Take 10 mL by mouth every 6 (six) hours as needed. 10/7/18  Yes PROVIDER, HISTORICAL   amoxicillin-clavulanate (AUGMENTIN) 875-125 mg per tablet Take 1 tablet by mouth 2 (two) times a day for 5 days. 10/9/18 10/14/18  Bert Hoover MD   sulfacetamide (BLEPH-10) 10 % ophthalmic solution Administer 2 drops to both eyes 4 (four) times a day for 10 days. 10/9/18 10/19/18  Bert Hoover MD        Past Medical History:   Past Medical History:   Diagnosis Date     Concussion     high school       Past Surgical History:   Past Surgical History:   Procedure Laterality Date     ANKLE SURGERY       SHOULDER ARTHROSCOPY       WRIST SURGERY         Social History:   Social History     Social History     Marital status: Single     Spouse name: N/A     Number of children: N/A     Years of education: N/A     Occupational History     Not on file.      Social History Main Topics     Smoking status: Never Smoker     Smokeless tobacco: Never Used     Alcohol use No      Comment: rare     Drug use: No     Sexual activity: Not on file     Other Topics Concern     Not on file     Social History Narrative       Family History:   Family History   Problem Relation Age of Onset     Hypertension Father      Coronary artery disease Paternal Grandfather          Bert Hoover MD

## 2021-06-20 NOTE — PROGRESS NOTES
Office Visit - Follow Up   Hugo Robles   33 y.o. male    Date of Visit: 10/8/2018    Chief Complaint   Patient presents with     Cough     Was seen at Urgent Care yesterday, rapid strep was neg and Whooping cough not back yet,  cough kept his awake most of the night, was given Ventolin inhaler, tesslon pereles and cough syrup with codiene with no releif,        Assessment and Plan   1. Viral URI with cough  Given history physical exam symptoms are very strongly suggestive of viral cough with flulike syndrome.  Fortunately strep is negative and lungs are relatively clear and there is no productive cough he does not meet the criteria for empiric antibiotics.  Bedrest is best advised pruritus.  Pruritus is very unlikely in a healthy male with no chronic disease and no immunosuppression history.  And we will await the results.  Ever it was since he needs to get back to work and he may get secondary infection there is the option to give a Z-Kameron later he does need to come and he can call.  If symptomatic treatment for cough there is nothing better than codeine and Tessalon.  Can try over-the-counter herbal treatments TheraFlu Zyrtec or Claritin to reduce the secretions or Tylenol PM.  He was given a work excuse letter for 3 days.  No further testing for his white count and needs to be done and I do not think HIV testing needs to be done given recent test.  V      No Follow-up on file.     History of Present Illness   This 33 y.o. 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  Could not sleep at night because of the hacking cough did go to the urgent care and they did a strep test and pertussis labs because he was past due from his Tdap or almost due and he described a hacking cough no chest x-ray was done we do not have the results of the pertussis testing.  But we do have the strep  "test which was negative he was given symptomatic treatment which included codeine cough syrup Tessalon and ibuprofen.  He says those things did not help in fact they made him worse and he was up all night hacking.  He is here for a second opinion about his cough he is.  He is also worried about a history of a low white count he was working in [place] for several years.  Does does have is sexually active not no history of IV drug use.  He has been tested for HIV multiple times including 6 months ago at [facility].  His last white count showed a white count of 4 which was known normal with slightly mild monocytosis.        Review of Systems: A comprehensive review of systems was negative except as noted.     Medications, Allergies and Problem List   Reviewed, reconciled and updated     Physical Exam   General Appearance:       /68 (Patient Site: Right Arm, Patient Position: Sitting, Cuff Size: Adult Large)  Pulse 72  Temp 97.7  F (36.5  C)  Ht 5' 7.5\" (1.715 m)  Wt 162 lb (73.5 kg)  SpO2 97%  BMI 25 kg/m2    General appearance - alert, well appearing, and in no distress  Mental status - alert, oriented to person, place, and time  Neck - supple, no significant adenopathy  Lymphatics - no palpable lymphadenopathy, no hepatosplenomegaly  Chest - clear to auscultation, no wheezes, rales or rhonchi, symmetric air entry  Heart - normal rate, regular rhythm, normal S1, S2, no murmurs, rubs, clicks or gallops  Abdomen - soft, nontender, nondistended, no masses or organomegaly  Musculoskeletal - no joint tenderness, deformity or swelling  Extremities - peripheral pulses normal, no pedal edema, no clubbing or cyanosis  Skin - normal coloration and turgor, no rashes, no suspicious skin lesions noted  Ears NAD                                                                                      Additional Information   Current Outpatient Prescriptions   Medication Sig Dispense Refill     albuterol (PROAIR " HFA;PROVENTIL HFA;VENTOLIN HFA) 90 mcg/actuation inhaler Inhale 2 puffs as needed.       b complex vitamins tablet Take 1 tablet by mouth daily.       CASEIN ORAL Take by mouth 2 (two) times a day.       CREATINE MONOHYDRATE ORAL Take by mouth.       multivitamin therapeutic tablet Take 1 tablet by mouth daily.       OMEGA-3/DHA/EPA/FISH OIL (FISH OIL-OMEGA-3 FATTY ACIDS) 300-1,000 mg capsule Take 2 g by mouth daily.       VIRTUSSIN AC  mg/5 mL liquid Take 10 mL by mouth every 6 (six) hours as needed.  0     cetirizine (ZYRTEC) 10 MG tablet Take 1 tablet (10 mg total) by mouth daily. 90 tablet 3     No current facility-administered medications for this visit.      No Known Allergies  Social History   Substance Use Topics     Smoking status: Never Smoker     Smokeless tobacco: Never Used     Alcohol use No      Comment: rare          Daisy Martinez MD

## 2021-06-20 NOTE — PROGRESS NOTES
Patient came in for [facility] appt today for TB skin test- he is not able to come back for a read as he is going out of town tomorrow. He is interested in having the TB Gold today given the circumstances. He does need this for work. Order placed and patient went to lab

## 2021-06-23 NOTE — TELEPHONE ENCOUNTER
Pt called with provider's results and recommendations. Pt verbalized understanding. Pt has PT sessions scheduled and will follow-up as needed.

## 2021-06-23 NOTE — PROGRESS NOTES
ASSESSMENT: Hugo Robles is a 34 y.o. male presents for consultation at the request of HE PCP Bert Calloway MD, with past medical history significant for palpitations, tachycardia, presyncope, tonsillectomy, who presents today for new patient evaluation of :     -Ongoing chronic mild to moderate left-sided neck pain C3-4 region most consistent with facet mediated pain and cervical myofascial pain.    -Positive right Radha reflex of unknown significance.    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    Patient is neurologically intact on exam.  Patient does have positive right Radha reflex, however concerning no myelopathic or red flag symptoms.      NDI Score: 24    WHO 5: 20     PHQ-2: 0      Diagnoses and all orders for this visit:    Neck pain on left side  -     XR Cervical Spine 2 - 3 VWS  -     Ambulatory referral to PT/OT    Myofascial pain  -     Ambulatory referral to PT/OT       PLAN:  Reviewed spine anatomy and disease process. Discussed diagnosis and treatment options with the patient today. A shared decision making model was used. The patient's values and choices were respected. The following represents what was discussed and decided upon by the provider and the patient.     -DIAGNOSTIC TESTS:    --Did order cervical spine x-ray to further evaluate if any concerning findings would recommend cervical spine MRI, again patient does have right positive Radha reflex but no other concerning hyperreflexia or myelopathic symptoms.    -PHYSICAL THERAPY: Referral to physical therapy [facility] sent today for left neck pain.  Discussed the importance of core strengthening, ROM, stretching exercises with the patient and how each of these entities is important in decreasing pain.  Explained to the patient that the purpose of physical therapy is to teach the patient a home exercise program.  These  exercises need to be performed every day in order to decrease pain and prevent future occurrences of pain.  Likened it to brushing one's teeth.      -MEDICATIONS: No change in medications advised patient continue naproxen over-the-counter only as needed which she is currently doing very infrequently.  -Can continue CBD oil as needed which she also is doing infrequently.  -We discussed mild muscle relaxant however he reports that these are typically sedating for him therefore he prefers to avoid it which is reasonable.  Discussed multiple medication options today with patient. Discussed risks, side effects, and proper use of medications. Patient verbalized understanding.    -PATIENT EDUCATION: 45 minutes of total visit time was spent face to face with the patient today, greater than 50% of total time spent with patient was spent on counseling, education, and coordinating care.   -10 minutes spent outside of visit time, non-face-to-face time, reviewing chart.    -FOLLOW-UP:   Follow-up if symptoms are not improving or new symptoms arise, discussed with patient that we will call him with the cervical spine x-ray findings and let him know if there is is any change in the plan related to the findings.    Advised patient to call the [facility] if symptoms worsen or you have problems controlling bladder and bowel function.   ______________________________________________________________________    SUBJECTIVE:   Hugo Robles  is a 34 y.o. male who presents today for new patient evaluation of neck pain left cervical spine C3-4 region currently a 3/10 ongoing for the last 6-8 months with no known injury that is worse at times up to a 6 at its most bothersome.  Patient denies any radicular arm pain but he does have pain rating to the left trapezius region with some muscle tightness into the left upper thoracic/scapular region.    Patient reports that he had left sided upper buttock/SI joint pain he states at 2 points 1 3  years ago and one 1 year ago where he had severe acute pain in which she had taken steroids for at that time and physical therapy which did help but he just wanted to mention this today as well.  Not currently having any pain related to this, since then he has gotten heel lift for his leg length discrepancy that he wears on his right foot at all times.    Patient is a exercise fanatic he states working out 6 days a week which he does feel good with, he does state he lifts weights quite a bit as well but it does not seem to aggravate his neck or back pain.    -Treatment to Date: No prior spinal surgery or spinal injection.  No prior physical therapy for neck pain  Chiropractic treatment in the past for SI joint with benefit, currently for neck pain with minimal benefit.    -Medications:  CBD oil with some benefit  Naproxen very infrequently with some benefit.    Current Outpatient Medications on File Prior to Encounter   Medication Sig Dispense Refill     b complex vitamins tablet Take 1 tablet by mouth daily.       CASEIN ORAL Take by mouth 2 (two) times a day.       CREATINE MONOHYDRATE ORAL Take by mouth.       multivitamin therapeutic tablet Take 1 tablet by mouth daily.       OMEGA-3/DHA/EPA/FISH OIL (FISH OIL-OMEGA-3 FATTY ACIDS) 300-1,000 mg capsule Take 2 g by mouth daily.       No current facility-administered medications on file prior to encounter.        No Known Allergies    Past Medical History:   Diagnosis Date     Concussion     high school        Patient Active Problem List   Diagnosis     Palpitation     Tachycardia     Pre-syncope       Past Surgical History:   Procedure Laterality Date     ANKLE SURGERY       SHOULDER ARTHROSCOPY       WRIST SURGERY         Family History   Problem Relation Age of Onset     Hypertension Father      Coronary artery disease Paternal Grandfather        Reviewed past medical, surgical, and family history with patient found on new patient intake packet located in EMR  Media tab.     SOCIAL HX: Patient is currently in grad school, single.  Denies smoking/tobacco use, denies alcohol use, denies history being a heavy drinker, denies occasional drug use.    ROS: Positive for back pain, headache.  Specifically negative for bowel/bladder dysfunction, balance changes, dizziness, foot drop, fevers, chills, appetite changes, nausea/vomiting, unexplained weight loss. Otherwise 13 systems reviewed are negative. Please see the patient's intake questionnaire from today for details.    OBJECTIVE:  /72 (Patient Site: Right Arm, Patient Position: Sitting)   Pulse (!) 54   Wt 159 lb (72.1 kg)   SpO2 97%   BMI 24.54 kg/m      PHYSICAL EXAMINATION:  --CONSTITUTIONAL: Vital signs as above. No acute distress. The patient is well nourished and well groomed.  --PSYCHIATRIC: The patient is awake, alert, oriented to person, place, time and answering questions appropriately with clear speech. Appropriate mood and affect   --HEENT: Sclera are non-injected. Extraocular muscles are intact. Thyroid moves easily upon swallowing.  Moist oral mucosa.  --SKIN: Skin over the face, bilateral upper extremities, and posterior torso is clean, dry, intact without rashes.  --RESPIRATORY: Normal rhythm and effort. No abnormal accessory muscle breathing patterns noted.   --GROSS MOTOR: Easily arises from a seated position. Toe walking and heel walking are normal.    --CERVICAL SPINE: Inspection reveals no evidence of deformity. Range of motion is not limited in cervical flexion, extension, slight increased pain with left greater than right lateral rotation.  No tenderness to palpation cervical spine.  Spurling maneuver negative bilaterally.  --SHOULDERS: Full range of motion bilaterally. Negative empty can.  --UPPER EXTREMITY MOTOR TESTING:  Wrist flexion left 5/5, right 5/5  Wrist extension left 5/5, right 5/5  Pronators left 5/5, right 5/5  Biceps left 5/5, right 5/5   Triceps left 5/5, right 5/5   Shoulder  abduction left 5/5, right 5/5   left 5/5, right 5/5  --NEUROLOGIC: CN III-XII are grossly intact. 2/4 symmetric biceps, brachioradialis, triceps reflexes bilaterally. Sensation to upper extremities is intact.  Negative Hansen's on the left, positive on the right.  --VASCULAR: 2/4 radial pulses bilaterally. Warm upper limbs bilaterally. Capillary refill in the upper extremities is less than 1 second.    RESULTS: Prior medical records from [facility] 7/18/2018 to current and care everywhere were reviewed today.     Imaging:     No results found.

## 2021-06-23 NOTE — PROGRESS NOTES
"  Office Visit - Follow Up   Hugo Robles   33 y.o. male    Date of Visit: 1/18/2019    Chief Complaint   Patient presents with     Neck Pain     Ongoing nexk pain and upper back (scapula), off and on x many years, hoping to get referral for PT        Assessment and Plan   1. Strain of neck muscle, initial encounter  He is tried chiropractic care and home stretching exercises.  No benefit yet.  To get involved with some physical therapy.  I suggested a referral to our spine care program to coordinate his symptoms  - Ambulatory referral to Spine Care          No Follow-up on file.     History of Present Illness   This 33 y.o. 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  He would like to work with lessons with disabilities and language.  He complains for several years she has had neck pain and upper back pains and around the scapular area it seems to come and go.  Trying some home exercises.  Gone to chiropractic care.  This is not helped.  Pain occurs in the left side of his neck and frequently radiates a bit down into his upper back/rhomboid area.  Reports no weakness or trouble with mobility.    Review of Systems: A comprehensive review of systems was negative except as noted.     Medications, Allergies and Problem List   Reviewed, reconciled and updated     Physical Exam   General Appearance:     /74 (Patient Site: Right Arm, Patient Position: Sitting, Cuff Size: Adult Large)   Pulse (!) 58   Ht 5' 7.5\" (1.715 m)   Wt 167 lb (75.8 kg)   SpO2 98%   BMI 25.77 kg/m      His neck has full range of motion.  He does have some tightness in his paraspinal muscles on the left.  No adenopathy in the neck.  Motor strength is normal in the upper and lower extremities.  Muscle tone is very strong.     Additional Information   Current Outpatient Medications   Medication Sig Dispense Refill     b complex vitamins tablet Take 1 tablet by mouth daily.       CASEIN ORAL " Take by mouth 2 (two) times a day.       CREATINE MONOHYDRATE ORAL Take by mouth.       multivitamin therapeutic tablet Take 1 tablet by mouth daily.       OMEGA-3/DHA/EPA/FISH OIL (FISH OIL-OMEGA-3 FATTY ACIDS) 300-1,000 mg capsule Take 2 g by mouth daily.       No current facility-administered medications for this visit.      No Known Allergies  Social History     Tobacco Use     Smoking status: Never Smoker     Smokeless tobacco: Never Used   Substance Use Topics     Alcohol use: No     Comment: rare     Drug use: No       Review and/or order of clinical lab tests:  Review and/or order of radiology tests:  Review and/or order of medicine tests:  Discussion of test results with performing physician:  Decision to obtain old records and/or obtain history from someone other than the patient:  Review and summarization of old records and/or obtaining history from someone other than the patient and.or discussion of case with another health care provider:  Independent visualization of image, tracing or specimen itself:    Time: total time spent with the patient was 15 minutes of which >50% was spent in counseling and coordination of care     Raad Hathaway MD

## 2021-06-23 NOTE — PROGRESS NOTES
[facility]   Cervical Thoracic Initial Evaluation    Patient Name: Hugo Robles  Date of evaluation: 1/28/2019  Referral Diagnosis: Neck pain on left side, myofascial pain   Referring provider: Ellie Foster C*  Visit Diagnosis:     ICD-10-CM    1. Neck pain on left side M54.2    2. Musculoskeletal disorder involving upper trapezius muscle M53.82      Precautions: palpitations. 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 year old male with chronic mild to moderate left-sided neck pain C3-4 region with pain into left trapezius.  Patient has difficulty with studying, reading, writing and lifting heavy upper body weights. Findings are consistent with facet mediated pain and cervical/upper back myofascial pain.  Pt presents with positive median and ulnar nerve testing, along with painful AROM SB and Rot to left.   Patient appears motivated to participate in Physical Therapy and present with a good Physical Therapy prognosis for resolution of activities limitations.        Pt. is appropriate for skilled PT intervention as outlined in the Plan of Care (POC).  Pt. is a good candidate for skilled PT services to improve pain levels and function.  Plan of care and goals were established in collaboration with patient.     Goals:  Pt. will demonstrate/verbalize independence in self-management of condition in : 12 weeks  Pt. will be independent with home exercise program in : 6 weeks  Pt. will improve posture : and maintain posture for;Comment;in 6 weeks  Comment:: studying with lumbar support and less neck pain   Patient Turn Head: for other;with full ROM;wiith no pain;in 12 weeks    Pt will: return to lifting heavy weights without pain in 12 weeks to allow for  increased QOL.       Patient's goals: eliminate neck pain     Patient's expectations/goals are realistic.    Barriers to Learning or Achieving Goals:  No Barriers.       Plan / Patient Instructions:        Plan of Care:   Authorization / Certification Start Date: 01/28/19  Authorization / Certification End Date: 04/22/19  Authorization / Certification Number of Visits: up to 12  Communication with: Referral Source  Patient Related Instruction: Nature of Condition;Treatment plan and rationale;Self Care instruction;Posture;Expected outcome;Next steps  Times per Week: 1  Number of Weeks: 12  Number of Visits: 12  Discharge Planning: independent with HEP and self-management of symptoms   Therapeutic Exercise: ROM;Stretching;Strengthening  Neuromuscular Reeducation: kinesio tape;posture;core  Manual Therapy: soft tissue mobilization;myofascial release;joint mobilization;muscle energy  Modalities: hot pack  Equipment: theraband      Plan for next visit: foam roller, I, T, Y on therapeutic ball   Median and ulnar nerve glides, MFR   Deep neck flexor test, cervical retraction and shoulder retraction      Subjective:         Social information:   Occupation:    Work Status:Working full time - 60 hours (sitting and studying)    Equipment Available: None    History of Present Illness:    Hugo is a 34 y.o. male who presents to therapy today with complaints of left side neck and upper trapezius pain with no significant findings on x-ray. Chiropractic care has not been helpful.  Date of onset/duration of symptoms is July 2018 but pt has had pain on and off for years. Onset was gradual. Symptoms are constant. He reports  an episodic  history of similar symptoms. He describes their previous level of function as limited with turning neck, reading     Headaches: worse when studying     Pain Rating:3  Pain rating at best: 3  Pain rating at worst: 7 can result in headache (every few weeks 7/10)   Pain description: upper  neck and shoulder on left - dull pain    Tingling in upper back     Surgery: L shoulder capsule tightening 10+ years ago    Functional limitations are described as occurring with:   sitting >60 min, writing, turning head     Patient reports benefit from:  massage, lacrosse ball, thera cane, patches,      Exercise: pt works out 6x a week at the gym - lifting, HIIT, running, etc.     Objective:      Note: Items left blank indicates the item was not performed or not indicated at the time of the evaluation.    Patient Outcome Measures :    Neck Disability Score in %: 26     Scores range from 0-100%, where a score of 0% represents minimal pain and maximal function. The minmal clinically important difference is a score reduction of 10%.    Cervical Thoracic Examination  1. Neck pain on left side     2. Musculoskeletal disorder involving upper trapezius muscle       Involved side: Left  Posture Observation:      General sitting posture is  fair.    Cervical ROM:    Date: 1/28/2019     *Indicate scale AROM AROM AROM   Cervical Flexion (45) WLF     Cervical Extension (45) WFL - pain       Right Left Right Left Right Left   Cervical Sidebending (45) 45 pain on L  38 pain on L       Cervical Rotation (60-80)  70 60 pain on L        Cervical Protraction      Cervical Retraction      Thoracic Flexion      Thoracic Extension      Thoracic Sidebending         Thoracic Rotation           Strength     Date: 1/28/2019     Cervical Myotomes/5 Right Left Right Left Right Left   Cervical Flexion (C1-2)         Cervical Sidebending (C3)         Shoulder Elevation (C4) 5 5       Shoulder Abduction (C5) 5 5       Elbow Flexion (C6)         Elbow Extension (C7)         Wrist Flexion (C7)         Wrist Extension (C6)         Thumb abduction (C8)         Finger Abduction (T1)           Sensation      Reflex Testing  Cervical Dermatomes Right Left UE Reflexes Right Left   Back of the Head (C2)   Biceps (C5-6)     Supraclavicular Fossa (C3)    Brachioradialis (C5-6)     AC Joint (C4)   Triceps (C7-8)     Lateral Biceps (C5)   Radha s test - -   Palmar Thumb (C6)   LE Reflexes     Palmar 3rd Finger (C7)   Patellar (L3-4)     Palmar 5th Finger (C8)   Achilles (S1-2)     Ulnar Forearm (T1)   Babinski Response         Palpation: pain in cervical erectors on L   Pain in SCM, upper trap, supra/infraspinatus    Passive Mobility-Joint Integrity: Spring testing: Hypermobile. overall in cervical spine, segmental hypomobility.  Hypomobility in thoracic spine     Cervical Special Tests     Cervical Special Tests Right Left UE Nerve Mobility Right Left   Cervical compression   Median nerve - +   Cervical distraction   Ulnar nerve - +   Spurling s test   Radial nerve     Shoulder abduction sign: holding hand over head    Thoracic outlet     Deep neck flexor endurance test  Healthy controls: 39 sec   Mirian: (90/90 hands open close 3 min)     Upper cervical rotation - - Adson s: (ext/rot to side lose of radial pulse)     Sharper-Gladys: transverse ligament (hold C1 and lift head)    Cervical rotation lateral flexion: rotate away, SB towards (checks for 1st rib elevation)      Alar ligament test: spinous process C2 moves opposite direction with SB in supine   Other:     Vertebral artery test   Other:       UE Screen: Not tested.    Treatment Today     TREATMENT MINUTES COMMENTS   Evaluation 30 -cervical spine   Self-care/ Home management 8 Discussed use of heat vs ice.   Posture: recommended use and trialed lumbar support (use rolled up towel) to use while studying. Discussed forward head posture and increased strain on neck with studying.  Recommended pt check in with his body to avoid pain progressing from 3/10 to 7/10 and causing a HA.    Manual therapy 10 Prone: 1x grade V thoracic mob  Grade III mobs on L cervicals   Supine: STM to SCM, occipital release      Neuromuscular Re-education     Therapeutic Activity     Therapeutic Exercises 20 -see exercise flow  sheet  -educated on POC, diagnosis, HEP, relevant anatomy and bases for treatment.    Gait training     Modality__________________                Total 68    Blank areas are intentional and mean the treatment did not include these items.     PT Evaluation Code: (Please list factors)  Patient History/Comorbidities: see above   Examination: see above  Clinical Presentation: stable  Clinical Decision Making: low    Patient History/  Comorbidities Examination  (body structures and functions, activity limitations, and/or participation restrictions) Clinical Presentation Clinical Decision Making (Complexity)   No documented Comorbidities or personal factors 1-2 Elements Stable and/or uncomplicated Low   1-2 documented comorbidities or personal factor 3 Elements Evolving clinical presentation with changing characteristics Moderate   3-4 documented comorbidities or personal factors 4 or more Unstable and unpredictable High                Edilma Mcmullen, PT, DPT  1/28/2019  10:20 AM

## 2021-06-23 NOTE — TELEPHONE ENCOUNTER
----- Message from Ellie Foster CNP sent at 1/29/2019 10:12 AM CST -----  Please call patient and notify him that I did review his cervical spine x-ray which looks really good, alignment and disc height are normal, no concerning findings.  Recommend physical therapy as ordered and follow-up if symptoms are not improving with PT or anytime if new symptoms arise.

## 2021-06-23 NOTE — PATIENT INSTRUCTIONS - HE
[facility] Radiology Locations    Please call [phone] to schedule your image(s) (select option #1 and then #2). There are 3 different locations, see below. You can do walk-in visits for xray only images if you want.     [facility]  [address] [place] [id number]    [facility]   [address] [address]    [place] [id number] [facility] [address]      Discussed the importance of core strengthening, ROM, stretching exercises with the patient and how each of these entities is important in decreasing pain.  Explained to the patient that the purpose of physical therapy is to teach the patient a home exercise program.  These exercises need to be performed every day in order to decrease pain and prevent future occurrences of pain.        ~Please call Nurse Navigation line [phone] with any questions or concerns about your treatment plan, if symptoms worsen and you would like to be seen urgently, or if you have problems controlling bladder and bowel function.  ~Follow Up Appointment time slots with Ellie Foster CNP with [facility], are also available at the [facility] location near [facility] on the first and third THURSDAY afternoons of each month.

## 2021-06-24 NOTE — PROGRESS NOTES
[facility] Daily Progress     Patient Name: Hugo Robles  Date: 3/15/2019   Visit #: 6/12 (10 visits are authorized)   PTA visit #:    Referral Diagnosis:Neck pain on left side, myofascial pain   Referring provider: Ellie Foster C*  Visit Diagnosis:     ICD-10-CM    1. Neck pain on left side M54.2    2. Musculoskeletal disorder involving upper trapezius muscle M53.82      Precautions: palpitations. 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 year old male with chronic mild to moderate left-sided neck pain C3-4 region with pain into left trapezius.  Patient has difficulty with studying, reading, writing and lifting heavy upper body weights. Findings are consistent with facet mediated pain and cervical/upper back myofascial pain.  Pt presents with positive median and ulnar nerve testing, along with painful AROM SB and Rot to left.    Patient demonstrates understanding/independence with home program.  Patient is benefitting from skilled physical therapy and is making steady progress toward functional goals.  Patient is appropriate to continue with skilled physical therapy intervention, as indicated by initial plan of care.    Goal Status:  Pt. will demonstrate/verbalize independence in self-management of condition in : 12 weeks  Pt. will be independent with home exercise program in : 6 weeks  Pt. will improve posture : and maintain posture for;Comment;in 6 weeks  Comment:: studying with lumbar support and less neck pain   Patient Turn Head: for other;with full ROM;wiith no pain;in 12 weeks    Pt will: return to lifting heavy weights without pain in 12 weeks to allow for increased QOL.       Plan / Patient Education:     Continue with initial plan of care.  Progress with home program as tolerated.     Plan for next session: Ask about return to swim,  "chin tuck and lift review, check nerve glides, STM, mobs, ask about response to cupping     Subjective:   Pt reports he is continuing to improve until the last couple days   Shoulder feeling better after \"hanging therapy\".  At a computer more with not the best set-up at new clinical (is there for another 5 weeks).  No HAs. . Pt was not sore after last session. Continued to back off of UE strengthening at gym. Has not returned to swimming.     Pain Rating: not assessed      Objective:     Exercises:  Exercise #1: chin tuck in sitting and supine - chin tuck and lift 10 sec x5-8 reps   Comment #1: foam roller: horizontal and vertical, chest openner and upper back self-massage and facet joint mobility  Exercise #2: Nerve glides: median nerve tensioners and rockers, ulnar nerve butterfly x10-15   Comment #2: SNAGS   Exercise #3: I's, W's in prone 10 sec hold x10 - on therapeutic ball added T's   Comment #3: Serratus: punches with 12# 2X10-15, push up + in plank, with and without bosu (1/2 push-up with push-up +)   Exercise #4: Dynamic plank on ex's ball and on bosu, plank with weight to avoid wrist pain   Comment #4: Discussed RC strengthing pt is performing IR, ER with band - added scaption with 2# high repetition   Cervical ROM:             Date: 1/28/2019  3/8/19     *Indicate scale AROM AROM AROM   Cervical Flexion (45) WLF       Cervical Extension (45) WFL - pain   WFL slightly less pain        Right Left Right Left Right Left   Cervical Sidebending (45) 45 pain on L  38 pain on L  50 tightness on L   55 tightness on R - slight pain along spine, tingling       Cervical Rotation (60-80)  70 60 pain on L  76  68 no P         Chin tuck and lift: 20 seconds - improvement from last session       Treatment Today     TREATMENT MINUTES COMMENTS   Evaluation     Self-care/ Home management  Heat 1x a day for 20 min    Manual therapy 60 Supine: cervical spinal erectors (hypertonic on L upper trap along spine), SCM, occipital " release   Prone: infraspinatus bilaterally, L supraspinatus, UT, rhomoids  Rib mobs and thoracic mobs grade III-IV   Cupping along spinal erectors (thoracic/cervical), rhomboid, UT: increased redness bilaterally (R>L).  Redness started to dissipated after 10 min.     Neuromuscular Re-education     Therapeutic Activity     Therapeutic Exercises  See ex's flow sheet   No new ex's given today.    Gait training     Modality__________________                Total 60    Blank areas are intentional and mean the treatment did not include these items.       Edilma Mcmullen, PT, DPT  3/15/2019

## 2021-06-24 NOTE — PROGRESS NOTES
[facility] Daily Progress     Patient Name: Hugo Robles  Date: 3/1/2019   Visit #: 4/12  PTA visit #:    Referral Diagnosis:Neck pain on left side, myofascial pain   Referring provider: Ellie Foster C*  Visit Diagnosis:     ICD-10-CM    1. Neck pain on left side M54.2    2. Musculoskeletal disorder involving upper trapezius muscle M53.82      Precautions: palpitations. 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  Started shoulder strengthening today to address L shoulder discomfort (pt had capsular tightening surgery in the past).     From Evaluation: Pt is a 34 y.o. year old male with chronic mild to moderate left-sided neck pain C3-4 region with pain into left trapezius.  Patient has difficulty with studying, reading, writing and lifting heavy upper body weights. Findings are consistent with facet mediated pain and cervical/upper back myofascial pain.  Pt presents with positive median and ulnar nerve testing, along with painful AROM SB and Rot to left.    Patient demonstrates understanding/independence with home program.  Patient is benefitting from skilled physical therapy and is making steady progress toward functional goals.  Patient is appropriate to continue with skilled physical therapy intervention, as indicated by initial plan of care.    Goal Status:  Pt. will demonstrate/verbalize independence in self-management of condition in : 12 weeks  Pt. will be independent with home exercise program in : 6 weeks  Pt. will improve posture : and maintain posture for;Comment;in 6 weeks  Comment:: studying with lumbar support and less neck pain   Patient Turn Head: for other;with full ROM;wiith no pain;in 12 weeks    Pt will: return to lifting heavy weights without pain in 12 weeks to allow for increased QOL.        Plan / Patient Education:     Continue with initial plan of care.  Progress with home program as tolerated.     Plan for next session: Ask about return to swim, chin tuck and lift review, dynamic strengthening     Subjective:   Saturday and Sunday was sore from nerve flossing and past session.   This week pt has been feeling better 8-10% better  Less pain with cervical retractions.   Backed off of UE weight lifting.    No HAs.     Pain Rating: not assessed      Objective:     Exercises:  Exercise #1: chin tuck in sitting and supine - chin tuck and lift 10 sec x5-8 reps   Comment #1: foam roller: horizontal and vertical, chest openner and upper back self-massage and facet joint mobility  Exercise #2: Nerve glides: median nerve tensioners and rockers, ulnar nerve butterfly x10-15   Comment #2: SNAGS   Exercise #3: I's, W's in prone 10 sec hold x10 - on therapeutic ball added T's   Comment #3: Serratus: punches with 12# 2X10-15, push up + in plank, with and without bosu (1/2 push-up with push-up +)   Exercise #4: Dynamic plank on ex's ball and on bosu, plank with weight to avoid wrist pain   Comment #4: Discussed RC strengthing pt is performing IR, ER with band - added scaption with 2# high repetition       Treatment Today     TREATMENT MINUTES COMMENTS   Evaluation     Self-care/ Home management  Heat 1x a day for 20 min    Manual therapy 30 Supine: cervical spinal erectors (hypertonic on L upper trap along spine), SCM, scalene, occipital release (L>R)  Nerve glides: median and ulnar bilaterally (L>R) - towel under L shoulder to avoid hyperextension   HEP: median nerve sliders - avoid hyperextension of shoulder on L to prevent shoulder pain - focus on wrist extension       Neuromuscular Re-education     Therapeutic Activity     Therapeutic Exercises 25 See ex's flow sheet    Gait training     Modality__________________                Total 55    Blank areas are intentional and mean the treatment did not include  these items.       Edilma Mcmullen, PT, DPT  3/1/2019

## 2021-06-24 NOTE — PROGRESS NOTES
[facility] Daily Progress     Patient Name: Hugo Robles  Date: 2/15/2019  Visit #: 2/12  PTA visit #:    Referral Diagnosis:Neck pain on left side, myofascial pain   Referring provider: Ellie Foster C*  Visit Diagnosis:     ICD-10-CM    1. Neck pain on left side M54.2    2. Musculoskeletal disorder involving upper trapezius muscle M53.82      Precautions: palpitations. 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 year old male with chronic mild to moderate left-sided neck pain C3-4 region with pain into left trapezius.  Patient has difficulty with studying, reading, writing and lifting heavy upper body weights. Findings are consistent with facet mediated pain and cervical/upper back myofascial pain.  Pt presents with positive median and ulnar nerve testing, along with painful AROM SB and Rot to left.    Patient demonstrates understanding/independence with home program.  Patient is benefitting from skilled physical therapy and is making steady progress toward functional goals.  Patient is appropriate to continue with skilled physical therapy intervention, as indicated by initial plan of care.    Goal Status:  Pt. will demonstrate/verbalize independence in self-management of condition in : 12 weeks  Pt. will be independent with home exercise program in : 6 weeks  Pt. will improve posture : and maintain posture for;Comment;in 6 weeks  Comment:: studying with lumbar support and less neck pain   Patient Turn Head: for other;with full ROM;wiith no pain;in 12 weeks    Pt will: return to lifting heavy weights without pain in 12 weeks to allow for increased QOL.       Plan / Patient Education:     Continue with initial plan of care.  Progress with home program as tolerated.     Plan for next session: I, T, Y on therapeutic ball, shoulder retraction,  retest nerve glides.   Rows and lat pull down, neck stretching     Subjective:   No HA since last session (only happen ever 3 weeks)   Pain Ratin  Pt has not been compliant with HEP. Pt has been doing more frequent breaks, every hour while studying.      Objective:     Exercises:  Exercise #1: chin tuck in sitting and supine - chin tuck and lift 10 sec x5-8 reps   Comment #1: foam roller: horizontal and vertical, chest openner and upper back self-massage and facet joint mobility  Exercise #2: Nerve glides: median nerve tensioners and rockers, ulnar nerve butterfly x10-15   Comment #2: SNAGS       Treatment Today     TREATMENT MINUTES COMMENTS   Evaluation     Self-care/ Home management 10 Discussed importance of posture and using lumbar towel roll and using support of back rest when studying.  Discussed placement of monitor at eye level but challenge of using lab top.    Diaphragmatic breathing, avoid using accessory breathing muscles (scalenes, and STM).   Relaxation throughout the day.   Importance and benefit of performing HEP daily   Manual therapy 40 Prone: STM and TP in UT, supra/infraspinatus (L>R) - grade IV-V mobs thoracic spine  Supine: cervical spinal erectors (hypertonic on L), SCM, scalene, occipital release (L>R), UT   Neuromuscular Re-education     Therapeutic Activity     Therapeutic Exercises 15 See ex's flow sheet    Gait training     Modality__________________                Total 65    Blank areas are intentional and mean the treatment did not include these items.       Edilma Mcmullen, PT, DPT  2/15/2019

## 2021-06-24 NOTE — PROGRESS NOTES
[facility] Daily Progress     Patient Name: Hugo Robles  Date: 2/22/2019  Visit #: 3/12  PTA visit #:    Referral Diagnosis:Neck pain on left side, myofascial pain   Referring provider: Ellie Foster C*  Visit Diagnosis:     ICD-10-CM    1. Neck pain on left side M54.2    2. Musculoskeletal disorder involving upper trapezius muscle M53.82      Precautions: palpitations. 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 year old male with chronic mild to moderate left-sided neck pain C3-4 region with pain into left trapezius.  Patient has difficulty with studying, reading, writing and lifting heavy upper body weights. Findings are consistent with facet mediated pain and cervical/upper back myofascial pain.  Pt presents with positive median and ulnar nerve testing, along with painful AROM SB and Rot to left.    Patient demonstrates understanding/independence with home program.  Patient is benefitting from skilled physical therapy and is making steady progress toward functional goals.  Patient is appropriate to continue with skilled physical therapy intervention, as indicated by initial plan of care.    Goal Status:  Pt. will demonstrate/verbalize independence in self-management of condition in : 12 weeks  Pt. will be independent with home exercise program in : 6 weeks  Pt. will improve posture : and maintain posture for;Comment;in 6 weeks  Comment:: studying with lumbar support and less neck pain   Patient Turn Head: for other;with full ROM;wiith no pain;in 12 weeks    Pt will: return to lifting heavy weights without pain in 12 weeks to allow for increased QOL.       Plan / Patient Education:     Continue with initial plan of care.  Progress with home program as tolerated.     Plan for next session:STM left cervical spine,  I, T, Y on therapeutic ball,  shoulder retraction, retest nerve glides.   Rows and lat pull down, neck stretching     Subjective:   Pt reports he had a flare-up.  Pt was lifting heavy weights. And pt had HA for a day. L neck tight.  2 days pain relief after last session.     (HAs happen ever 3 weeks mild)   Pain Rating: not assessed      Objective:     Exercises:  Exercise #1: chin tuck in sitting and supine - chin tuck and lift 10 sec x5-8 reps   Comment #1: foam roller: horizontal and vertical, chest openner and upper back self-massage and facet joint mobility  Exercise #2: Nerve glides: median nerve tensioners and rockers, ulnar nerve butterfly x10-15   Comment #2: SNAGS   Exercise #3: I's, W's in prone 10 sec hold x10       Treatment Today     TREATMENT MINUTES COMMENTS   Evaluation     Self-care/ Home management  Continued to discuss posture while studying   Manual therapy 40 Supine: cervical spinal erectors (hypertonic on L), SCM, scalene, occipital release (L>R)  Nerve glides: median and ulnar on L   Self-massage with dual tennis ball at occiput and thoracic spine.    Neuromuscular Re-education     Therapeutic Activity     Therapeutic Exercises 15 See ex's flow sheet - recommended pt avoid increasing UE weight lifting until he has been symptoms-free or decreased symptoms for 2 weeks.   Suggested return to swim 15-20 min and evaluate how neck does with turning head while swimming.  Pt has been avoiding swimming recently.   Recommended pt increase nerve glides at home 3-4x a day.    Gait training     Modality__________________                Total 55    Blank areas are intentional and mean the treatment did not include these items.       Edilma Mcmullen, PT, DPT  2/22/2019

## 2021-06-25 NOTE — PROGRESS NOTES
[facility] Daily Progress     Patient Name: Hugo Robles  Date: 3/22/2019   Visit #: 7/12 (10 visits are authorized)   PTA visit #:    Referral Diagnosis:Neck pain on left side, myofascial pain   Referring provider: Ellie Foster C*  Visit Diagnosis:     ICD-10-CM    1. Neck pain on left side M54.2    2. Musculoskeletal disorder involving upper trapezius muscle M53.82      Precautions: palpitations. 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 year old male with chronic mild to moderate left-sided neck pain C3-4 region with pain into left trapezius.  Patient has difficulty with studying, reading, writing and lifting heavy upper body weights. Findings are consistent with facet mediated pain and cervical/upper back myofascial pain.  Pt presents with positive median and ulnar nerve testing, along with painful AROM SB and Rot to left.    Patient demonstrates understanding/independence with home program.  Patient is benefitting from skilled physical therapy and is making steady progress toward functional goals.  Patient is appropriate to continue with skilled physical therapy intervention, as indicated by initial plan of care.    Goal Status:  Pt. will demonstrate/verbalize independence in self-management of condition in : 12 weeks  Pt. will be independent with home exercise program in : 6 weeks  Pt. will improve posture : and maintain posture for;Comment;in 6 weeks  Comment:: studying with lumbar support and less neck pain   Patient Turn Head: for other;with full ROM;wiith no pain;in 12 weeks    Pt will: return to lifting heavy weights without pain in 12 weeks to allow for increased QOL.       Plan / Patient Education:     Continue with initial plan of care.  Progress with home program as tolerated.     Plan for next session: Ask about return to swim,  chin tuck and lift review, check nerve glides, STM     Subjective:   Pt felt great after cupping.  Hardly worked out this week.  Neck had been feeling great.  Pt reports 40% better.  ROM better, stiffness better.  Has been taking exams and studying all week.      Pain Rating: not assessed      Objective:     Exercises:  Exercise #1: chin tuck in sitting and supine - chin tuck and lift 10 sec x5-8 reps   Comment #1: foam roller: horizontal and vertical, chest openner and upper back self-massage and facet joint mobility  Exercise #2: Nerve glides: median nerve tensioners and rockers, ulnar nerve butterfly x10-15   Comment #2: SNAGS   Exercise #3: I's, W's in prone 10 sec hold x10 - on therapeutic ball added T's   Comment #3: Serratus: punches with 12# 2X10-15, push up + in plank, with and without bosu (1/2 push-up with push-up +)   Exercise #4: Dynamic plank on ex's ball and on bosu, plank with weight to avoid wrist pain   Comment #4: Discussed RC strengthing pt is performing IR, ER with band - added scaption with 2# high repetition   Exercise #5: Foam roller: lat stretch bilateral and unilateral ams   Exercise #6: 1/2 kneel thoraic rotation at wall   Comment #6: thread the needle       Cervical ROM:             Date: 1/28/2019  3/8/19     *Indicate scale AROM AROM AROM   Cervical Flexion (45) WLF       Cervical Extension (45) WFL - pain   WFL slightly less pain        Right Left Right Left Right Left   Cervical Sidebending (45) 45 pain on L  38 pain on L  50 tightness on L   55 tightness on R - slight pain along spine, tingling       Cervical Rotation (60-80)  70 60 pain on L  76  68 no P         Chin tuck and lift: 20 seconds - improvement from last session       Treatment Today     TREATMENT MINUTES COMMENTS   Evaluation     Self-care/ Home management     Manual therapy 50 Supine: cervical spinal erector, occipital release, UT PROM stretch   Prone: infraspinatus bilaterally,  supraspinatus, UT, rhomoids, spinal  erectors, QL  Cupping along spinal erectors (thoracic/cervical), rhomboid, UT: increased redness bilaterally (L>R). No redness observed from last session     Neuromuscular Re-education     Therapeutic Activity     Therapeutic Exercises 8 See ex's flow sheet    Gait training     Modality__________________                Total 58    Blank areas are intentional and mean the treatment did not include these items.       Edilma Mcmullen, PT, DPT  3/22/2019

## 2021-08-14 ENCOUNTER — HEALTH MAINTENANCE LETTER (OUTPATIENT)
Age: 36
End: 2021-08-14

## 2021-10-09 ENCOUNTER — HEALTH MAINTENANCE LETTER (OUTPATIENT)
Age: 36
End: 2021-10-09

## 2022-02-12 VITALS
TEMPERATURE: 97.7 F | SYSTOLIC BLOOD PRESSURE: 130 MMHG | DIASTOLIC BLOOD PRESSURE: 60 MMHG | HEART RATE: 60 BPM | RESPIRATION RATE: 16 BRPM | WEIGHT: 164 LBS | TEMPERATURE: 97.2 F | SYSTOLIC BLOOD PRESSURE: 116 MMHG | DIASTOLIC BLOOD PRESSURE: 80 MMHG | HEART RATE: 97 BPM

## 2022-02-15 NOTE — PROGRESS NOTES
Chief Complaint    Pt here for STD testing. Ex girlfriend had herpes. No symptoms. Would like testing before he startes dating again.  History of Present Illness      Patient comes in requesting testing for sexually transmitted infections.  He reports that his girlfriend who he recently broke up with had genital herpes and required antivirals to control this.  He himself has never had any symptoms.  Before he begins bleeding again he would like an STD test and specifically looking at HSV blood work.  Otherwise he has not had any other partners.  Physical Exam   Vitals & Measurements    T: 97.7(Tympanic)  HR: 97(Peripheral)  BP: 142/83     WT: 164 lb       Appears well  Assessment/Plan       Screen for STD (sexually transmitted disease)         We will screen for chlamydia, gonorrhea, and HIV.  Per request, we will do HSV serologies, however we discussed that there is a high likelihood that this will be positive, and if it is this does not necessarily mean he should change his sexual practices, and that he should just practice safe sex as usual.  Discussed that the positive antibody just means that he has been exposed and does not necessarily mean that he is high risk to transmitted to someone else.         Ordered:          06300 office outpatient visit 15 minutes (Charge), Quantity: 1, Screen for STD (sexually transmitted disease)          Chlamydia/Neisseria gonorrhoeae RNA, TMA* ([facility]), Specimen Type: Urine, Collection Date: 11/29/17 13:40:00 CST          HIV-1/2 Antigen and Antibodies, Fourth Generation, with Reflexes* ([facility]), Specimen Type: Serum, Collection Date: 11/29/17 13:40:00 CST          HSV 1/2 igg, herpeselect type specific ab* ([facility]), Specimen Type: Serum, Collection Date: 11/29/17 13:40:00 CST           Patient Information     Name:EVANGELIST COOMBS      Address:      [address] [place] [address]     Sex:Male     YOB: 1985     [phone]     Emergency  Contact:LEANDER COOMBS     MRN:[id number]     FIN:[id number]     Location:[facility]     Date of Service:11/29/2017      Primary Care Physician:       NONE ,   Problem List/Past Medical History    Ongoing     Seasonal allergies    Historical     H/O: chickenpox  Procedure/Surgical History     Extraction of wisdom tooth     History of tonsillectomy  Medications    cetirizine, daily  Allergies    No Known Medication Allergies  Social History    Smoking Status - 11/29/2017     Never smoker     Alcohol - 10/20/2017      Never     Employment and Education - 10/20/2017      Student, 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     Exercise and Physical Activity - 10/20/2017      Exercise frequency: 4-5 times per week.     Home and Environment - 10/20/2017      Marital status: Single. Risks in environment: owns secured gun.     Nutrition and Health - 10/20/2017      Type of diet: Regular.     Sexual - 10/20/2017      Sexually active: No. Sexual orientation: Straight or heterosexual.     Substance Abuse - 10/20/2017      Never     Tobacco - 10/20/2017      Never (less than 100 in lifetime)  Lab Results      Results (Last 90 days)      No results located.

## 2022-02-15 NOTE — PROGRESS NOTES
Patient:   EVANGELIST COOMBS            MRN: [id number]            FIN: [id number]               Age:   32 years     Sex:  Male     :  1985   Associated Diagnoses:   Contact dermatitis   Author:   Eduardo Fisher PA-C      Chief Complaint   10/19/2017 11:25 AM CDT  New pt here for itchy rash all over body that started yesterday after he go done swimming.        History of Present Illness   Chief complaint and symptoms noted above and confirmed with patient   he typically swims a few times a week and has never had any problems but yesterday after swimming he developed a whole body   rash, a little itchy, took zyrtec yesterday and put some hydrocortisone cream on a hot spot  but otherwise no other complaints  no new foods or medications      Review of Systems   Constitutional:  Negative.    Ear/Nose/Mouth/Throat:  Negative.    Respiratory:  Negative.       Health Status   Allergies:    Allergic Reactions (Selected)  No Known Medication Allergies   Medications:  (Selected)   Documented Medications  Documented  cetirizine: daily, 0 Refill(s), Type: Maintenance      Histories   Past Medical History:    Resolved  H/O: chickenpox ([id number]):  Resolved.   Family History:    Entire family history is negative.   Procedure history:    History of tonsillectomy (7361796201).  Extraction of wisdom tooth ([id number]).      Physical Examination   Vital Signs   10/19/2017 11:25 AM CDT Temperature Tympanic 97.2 DegF  LOW    Peripheral Pulse Rate 60 bpm    Pulse Site Radial artery    Respiratory Rate 16 br/min    Systolic Blood Pressure 130 mmHg    Diastolic Blood Pressure 60 mmHg    Mean Arterial Pressure 83 mmHg    BP Site Right arm      General:  No acute distress.    HENT:  Tympanic membranes are clear, No pharyngeal erythema, No sinus tenderness.    Neck:  Supple, Non-tender, No lymphadenopathy.    Respiratory:  Lungs are clear to auscultation.    Cardiovascular:  Normal rate, Regular rhythm, No murmur.    Integumentary:  fine  red small red petechial rash on arms and legs, abdomen and back, no drainage.       Impression and Plan   Diagnosis     Contact dermatitis ([id number] L25.9).     Summary:  continue with zyrtec, take benadryl qhs, will add tapering course of prednisone, follow up if not imrpoving.    Orders     Orders   Pharmacy:  predniSONE 20 mg oral tablet (Prescribe): See Instructions, Instructions: 2 tab(s) PO Qd x 4 days, 1 tab PO QD x 4 days, 1/2 tab PO QD x 4 days  with food or milk, # 14 tab(s), 0 Refill(s), Type: Maintenance, Pharmacy: [facility], 2 tab(s) PO Qd x 4 days, 1 tab PO QD x 4 days, 1/2 tab PO QD x 4 days; with food or milk.     Orders   Charges (Evaluation and Management):  78923 office outpatient new 20 minutes (Charge) (Order): Quantity: 1, Contact dermatitis.   Detail Level: Detailed

## 2022-09-11 ENCOUNTER — HEALTH MAINTENANCE LETTER (OUTPATIENT)
Age: 37
End: 2022-09-11

## 2023-10-07 ENCOUNTER — HEALTH MAINTENANCE LETTER (OUTPATIENT)
Age: 38
End: 2023-10-07